# Patient Record
Sex: FEMALE | Race: WHITE | NOT HISPANIC OR LATINO | Employment: FULL TIME | URBAN - METROPOLITAN AREA
[De-identification: names, ages, dates, MRNs, and addresses within clinical notes are randomized per-mention and may not be internally consistent; named-entity substitution may affect disease eponyms.]

---

## 2019-06-19 ENCOUNTER — TELEPHONE (OUTPATIENT)
Dept: OBSTETRICS AND GYNECOLOGY | Facility: CLINIC | Age: 34
End: 2019-06-19

## 2019-06-19 DIAGNOSIS — Z32.01 POSITIVE PREGNANCY TEST: Primary | ICD-10-CM

## 2019-06-19 NOTE — TELEPHONE ENCOUNTER
----- Message from Lorene Mishra sent at 6/19/2019  2:29 PM CDT -----  Contact: pt  She's calling in regards to schedule an appt,  New preg pt     pls call pt back at [phone] (home)

## 2019-06-19 NOTE — TELEPHONE ENCOUNTER
Spoke to patient and this is her first pregnancy. Scheduled her appointment for 07/22/19 at 1:00pm to see MARY Rubio at the [place] location. Pt. Aware to arrive 30 min prior to appt and to be prepared to give a urine sample. Patient will be self pay. Let her know the cost for an initial ob appt is $850. Patient has questions regarding payments. Let her know I will send a message to Emilee, our  to give her a call and answer her questions. Message sent to Emilee. Let patient know one of our nurses will be calling her to explain what will happen at the first visit and answer any questions she may have. Call ended.

## 2019-06-20 NOTE — TELEPHONE ENCOUNTER
Spoke to patient. Welcomed to practice, questions answered. Ultrasound ordered and scheduled with visit r/s at [facility] with Joanne on 7/16/19. Patient verbalized understanding.

## 2019-07-16 ENCOUNTER — LAB VISIT (OUTPATIENT)
Dept: LAB | Facility: HOSPITAL | Age: 34
End: 2019-07-16
Attending: ADVANCED PRACTICE MIDWIFE

## 2019-07-16 ENCOUNTER — INITIAL PRENATAL (OUTPATIENT)
Dept: OBSTETRICS AND GYNECOLOGY | Facility: CLINIC | Age: 34
End: 2019-07-16

## 2019-07-16 ENCOUNTER — PROCEDURE VISIT (OUTPATIENT)
Dept: OBSTETRICS AND GYNECOLOGY | Facility: CLINIC | Age: 34
End: 2019-07-16

## 2019-07-16 VITALS
SYSTOLIC BLOOD PRESSURE: 128 MMHG | DIASTOLIC BLOOD PRESSURE: 72 MMHG | BODY MASS INDEX: 30.14 KG/M2 | WEIGHT: 192.44 LBS

## 2019-07-16 DIAGNOSIS — Z32.01 POSITIVE PREGNANCY TEST: ICD-10-CM

## 2019-07-16 DIAGNOSIS — O26.841 UTERINE SIZE DATE DISCREPANCY, FIRST TRIMESTER: ICD-10-CM

## 2019-07-16 DIAGNOSIS — O26.891 RH NEGATIVE STATE IN ANTEPARTUM PERIOD, FIRST TRIMESTER: ICD-10-CM

## 2019-07-16 DIAGNOSIS — Z67.91 RH NEGATIVE STATE IN ANTEPARTUM PERIOD, FIRST TRIMESTER: ICD-10-CM

## 2019-07-16 DIAGNOSIS — Z34.81 SUPERVISION OF NORMAL INTRAUTERINE PREGNANCY IN MULTIGRAVIDA IN FIRST TRIMESTER: Primary | ICD-10-CM

## 2019-07-16 DIAGNOSIS — Z34.81 SUPERVISION OF NORMAL INTRAUTERINE PREGNANCY IN MULTIGRAVIDA IN FIRST TRIMESTER: ICD-10-CM

## 2019-07-16 LAB
BASOPHILS # BLD AUTO: 0.04 K/UL (ref 0–0.2)
BASOPHILS NFR BLD: 0.3 % (ref 0–1.9)
DIFFERENTIAL METHOD: ABNORMAL
EOSINOPHIL # BLD AUTO: 0.2 K/UL (ref 0–0.5)
EOSINOPHIL NFR BLD: 1.6 % (ref 0–8)
ERYTHROCYTE [DISTWIDTH] IN BLOOD BY AUTOMATED COUNT: 13.2 % (ref 11.5–14.5)
HCT VFR BLD AUTO: 36.5 % (ref 37–48.5)
HGB BLD-MCNC: 12 G/DL (ref 12–16)
IMM GRANULOCYTES # BLD AUTO: 0.03 K/UL (ref 0–0.04)
IMM GRANULOCYTES NFR BLD AUTO: 0.3 % (ref 0–0.5)
LYMPHOCYTES # BLD AUTO: 2.7 K/UL (ref 1–4.8)
LYMPHOCYTES NFR BLD: 23.6 % (ref 18–48)
MCH RBC QN AUTO: 30.4 PG (ref 27–31)
MCHC RBC AUTO-ENTMCNC: 32.9 G/DL (ref 32–36)
MCV RBC AUTO: 92 FL (ref 82–98)
MONOCYTES # BLD AUTO: 0.7 K/UL (ref 0.3–1)
MONOCYTES NFR BLD: 6.3 % (ref 4–15)
NEUTROPHILS # BLD AUTO: 7.9 K/UL (ref 1.8–7.7)
NEUTROPHILS NFR BLD: 67.9 % (ref 38–73)
NRBC BLD-RTO: 0 /100 WBC
PLATELET # BLD AUTO: 242 K/UL (ref 150–350)
PMV BLD AUTO: 10.5 FL (ref 9.2–12.9)
RBC # BLD AUTO: 3.95 M/UL (ref 4–5.4)
WBC # BLD AUTO: 11.6 K/UL (ref 3.9–12.7)

## 2019-07-16 PROCEDURE — 86592 SYPHILIS TEST NON-TREP QUAL: CPT

## 2019-07-16 PROCEDURE — 76801 PR US, OB <14WKS, TRANSABD, SINGLE GESTATION: ICD-10-PCS | Mod: 26,S$PBB,, | Performed by: OBSTETRICS & GYNECOLOGY

## 2019-07-16 PROCEDURE — 99999 PR PBB SHADOW E&M-EST. PATIENT-LVL II: ICD-10-PCS | Mod: PBBFAC,,, | Performed by: ADVANCED PRACTICE MIDWIFE

## 2019-07-16 PROCEDURE — 99203 PR OFFICE/OUTPT VISIT, NEW, LEVL III, 30-44 MIN: ICD-10-PCS | Mod: S$PBB,,, | Performed by: ADVANCED PRACTICE MIDWIFE

## 2019-07-16 PROCEDURE — 86850 RBC ANTIBODY SCREEN: CPT

## 2019-07-16 PROCEDURE — 86703 HIV-1/HIV-2 1 RESULT ANTBDY: CPT

## 2019-07-16 PROCEDURE — 86901 BLOOD TYPING SEROLOGIC RH(D): CPT

## 2019-07-16 PROCEDURE — 76801 OB US < 14 WKS SINGLE FETUS: CPT | Mod: PBBFAC | Performed by: OBSTETRICS & GYNECOLOGY

## 2019-07-16 PROCEDURE — 87340 HEPATITIS B SURFACE AG IA: CPT

## 2019-07-16 PROCEDURE — 76801 OB US < 14 WKS SINGLE FETUS: CPT | Mod: 26,S$PBB,, | Performed by: OBSTETRICS & GYNECOLOGY

## 2019-07-16 PROCEDURE — 36415 COLL VENOUS BLD VENIPUNCTURE: CPT

## 2019-07-16 PROCEDURE — 99212 OFFICE O/P EST SF 10 MIN: CPT | Mod: PBBFAC | Performed by: ADVANCED PRACTICE MIDWIFE

## 2019-07-16 PROCEDURE — 88175 CYTOPATH C/V AUTO FLUID REDO: CPT

## 2019-07-16 PROCEDURE — 99999 PR PBB SHADOW E&M-EST. PATIENT-LVL II: CPT | Mod: PBBFAC,,, | Performed by: ADVANCED PRACTICE MIDWIFE

## 2019-07-16 PROCEDURE — 85025 COMPLETE CBC W/AUTO DIFF WBC: CPT

## 2019-07-16 PROCEDURE — 87491 CHLMYD TRACH DNA AMP PROBE: CPT

## 2019-07-16 PROCEDURE — 86762 RUBELLA ANTIBODY: CPT

## 2019-07-16 PROCEDURE — 99203 OFFICE O/P NEW LOW 30 MIN: CPT | Mod: S$PBB,,, | Performed by: ADVANCED PRACTICE MIDWIFE

## 2019-07-17 PROBLEM — Z67.91 RH NEGATIVE STATE IN ANTEPARTUM PERIOD, FIRST TRIMESTER: Status: ACTIVE | Noted: 2019-07-17

## 2019-07-17 PROBLEM — O26.891 RH NEGATIVE STATE IN ANTEPARTUM PERIOD, FIRST TRIMESTER: Status: ACTIVE | Noted: 2019-07-17

## 2019-07-17 LAB
ABO + RH BLD: NORMAL
BLD GP AB SCN CELLS X3 SERPL QL: NORMAL
C TRACH DNA SPEC QL NAA+PROBE: NOT DETECTED
HBV SURFACE AG SERPL QL IA: NEGATIVE
HIV 1+2 AB+HIV1 P24 AG SERPL QL IA: NEGATIVE
N GONORRHOEA DNA SPEC QL NAA+PROBE: NOT DETECTED
RH BLD: NORMAL
RPR SER QL: NORMAL
RUBV IGG SER-ACNC: 13.8 IU/ML
RUBV IGG SER-IMP: REACTIVE

## 2019-07-17 NOTE — PROGRESS NOTES
CHIEF COMPLAINT:   Patient presents with      initial OB        HISTORY OF PRESENT ILLNESS  Adelaida Agarwal 33 y.o.  presents for initial OB  The patient has no complaints today.  + nausea, no vomiting. No bleeding or pain.  Pregnancy was planned.  Partner is supportive of pregnancy, present today-Antony.  Lives at home with spouse.  +Cats at home, aware of toxoplasmosis risks.  Works as a /.  Denies domestic abuse.  Denies chemical/pesticide/radiation exposure.    LMP: Patient's last menstrual period was 2019.  EDC: Estimated Date of Delivery: 20  EGA: 8w6d       Health Maintenance   Topic Date Due    Lipid Panel  1986    TETANUS VACCINE  2004    Pap Smear with HPV Cotest  2015    Influenza Vaccine  2019       Past Medical History:   Diagnosis Date    Tobacco use        Past Surgical History:   Procedure Laterality Date    TONSILLECTOMY      WISDOM TOOTH EXTRACTION         Family History   Problem Relation Age of Onset    Hyperlipidemia Father     Diabetes Maternal Grandmother     Cancer Paternal Grandmother         colon    Alcohol abuse Paternal Grandfather        Social History     Socioeconomic History    Marital status: Single     Spouse name: Not on file    Number of children: Not on file    Years of education: Not on file    Highest education level: Not on file   Occupational History    Not on file   Social Needs    Financial resource strain: Not on file    Food insecurity:     Worry: Not on file     Inability: Not on file    Transportation needs:     Medical: Not on file     Non-medical: Not on file   Tobacco Use    Smoking status: Former Smoker     Packs/day: 1.00     Types: Cigarettes     Last attempt to quit: 2019     Years since quittin.2    Tobacco comment: on chantix   Substance and Sexual Activity    Alcohol use: Not Currently     Comment: socially    Drug use: Never    Sexual activity: Yes    Lifestyle    Physical activity:     Days per week: Not on file     Minutes per session: Not on file    Stress: Not on file   Relationships    Social connections:     Talks on phone: Not on file     Gets together: Not on file     Attends Cheondoism service: Not on file     Active member of club or organization: Not on file     Attends meetings of clubs or organizations: Not on file     Relationship status: Not on file   Other Topics Concern    Not on file   Social History Narrative    Not on file       No current outpatient medications on file.     No current facility-administered medications for this visit.        Review of patient's allergies indicates:   Allergen Reactions    Penicillins Rash and Other (See Comments)         PHYSICAL EXAM   Vitals:    07/16/19 1431   BP: 128/72        PAIN SCALE: 0/10 None    PHYSICAL EXAM    ROS:  GENERAL: No fever, chills, fatigability or weight loss.  CV: Denies chest pain  PULM: Denies shortness of breath or wheezing.  ABDOMEN: Appetite fine. No weight loss. Denies diarrhea, abdominal pain, hematemesis or blood in stool.  URINARY: No flank pain, dysuria or hematuria.  REPRODUCTIVE: No abnormal vaginal bleeding.       PE:   APPEARANCE: Well nourished, well developed, in no acute distress  CHEST: Clear to auscultation bilaterally  CV: Regular rate and rhythm  BREASTS: Symmetrical, no skin changes or visible lesions. No palpable masses, nipple discharge or adenopathy bilaterally.  ABDOMEN: Soft. No tenderness or masses. No hepatosplenomegaly. No hernias  PELVIC:   VULVA: No lesions. Normal female genitalia.  URETHRAL MEATUS: Normal size and location, no lesions, no prolapse.  URETHRA: No masses, tenderness, prolapse or scarring.  VAGINA: Moist and well rugated, no discharge, no significant cystocele or rectocele.  CERVIX: No lesions, normal diameter, no stenosis, no cervical motion tenderness.   UTERUS: 8 wk size, regular shape, mobile, non-tender, normal position, good  support.  ADNEXA: No masses, tenderness or CDS nodularity.  ANUS PERINEUM: No lesions, no relaxation, no external hemorrhoids.     UPT +    A/P:     -      Patient was counseled today on A.C.S. Pap guidelines and recommendations for yearly pelvic exams, mammograms and monthly self breast exams; to see her PCP for other health maintenance and pregnancy.   -      Patient's medications and medical history reviewed with patient and implications in pregnancy.   -      Pregnancy course discussed and 'AtoZ' book given. Patient was counseled on proper weight gain based on the [facility]'s recommendations based on her pre-pregnancy weight. Discussed foods to avoid in pregnancy (i.e. sushi, fish that are high in mercury, deli meat, and unpasteurized cheeses). Discussed prenatal vitamin options (i.e. stool softener, DHA). Discussed potential medical problems in pregnancy.  -     Discussed risk of Toxoplasmosis transmission from pets and reviewed risk reduction techniques.  -     Pt was counseled on exercise in pregnancy and weight gain recommendations.  -     Pt was counseled on travel recommendations and on risks of Zika virus exposure.  Current CDC Zika advisories and prevention techniques were reviewed with pt.  Pt denies any recent international travel and does not plan travel during pregnancy.  Pt reports that partner does not plan travel either.  -     Oriented to practice including CNM collaboration.   -     Follow-up routine OB  labs and u/s fabian palomoD. al

## 2021-04-19 ENCOUNTER — PATIENT MESSAGE (OUTPATIENT)
Dept: OBSTETRICS AND GYNECOLOGY | Facility: CLINIC | Age: 36
End: 2021-04-19

## 2021-04-19 DIAGNOSIS — Z34.90 PRENATAL CARE, ANTEPARTUM: Primary | ICD-10-CM

## 2021-04-20 ENCOUNTER — TELEPHONE (OUTPATIENT)
Dept: MATERNAL FETAL MEDICINE | Facility: CLINIC | Age: 36
End: 2021-04-20

## 2021-04-27 ENCOUNTER — INITIAL PRENATAL (OUTPATIENT)
Dept: OBSTETRICS AND GYNECOLOGY | Facility: CLINIC | Age: 36
End: 2021-04-27
Payer: COMMERCIAL

## 2021-04-27 ENCOUNTER — LAB VISIT (OUTPATIENT)
Dept: LAB | Facility: OTHER | Age: 36
End: 2021-04-27
Payer: COMMERCIAL

## 2021-04-27 VITALS
DIASTOLIC BLOOD PRESSURE: 74 MMHG | WEIGHT: 221.81 LBS | BODY MASS INDEX: 34.74 KG/M2 | SYSTOLIC BLOOD PRESSURE: 116 MMHG

## 2021-04-27 DIAGNOSIS — N91.2 AMENORRHEA: ICD-10-CM

## 2021-04-27 DIAGNOSIS — Z67.91 RH NEGATIVE STATE IN ANTEPARTUM PERIOD, FIRST TRIMESTER: ICD-10-CM

## 2021-04-27 DIAGNOSIS — Z34.90 PREGNANCY WITH ONE FETUS, ANTEPARTUM: ICD-10-CM

## 2021-04-27 DIAGNOSIS — Z34.80 ENCOUNTER FOR SUPERVISION OF NORMAL INTRAUTERINE PREGNANCY IN MULTIGRAVIDA, ANTEPARTUM: Primary | ICD-10-CM

## 2021-04-27 DIAGNOSIS — O26.891 RH NEGATIVE STATE IN ANTEPARTUM PERIOD, FIRST TRIMESTER: ICD-10-CM

## 2021-04-27 LAB
ABO + RH BLD: NORMAL
ANION GAP SERPL CALC-SCNC: 10 MMOL/L (ref 8–16)
BASOPHILS # BLD AUTO: 0.02 K/UL (ref 0–0.2)
BASOPHILS NFR BLD: 0.2 % (ref 0–1.9)
BLD GP AB SCN CELLS X3 SERPL QL: NORMAL
BUN SERPL-MCNC: 8 MG/DL (ref 6–20)
CALCIUM SERPL-MCNC: 8.7 MG/DL (ref 8.7–10.5)
CHLORIDE SERPL-SCNC: 105 MMOL/L (ref 95–110)
CO2 SERPL-SCNC: 22 MMOL/L (ref 23–29)
CREAT SERPL-MCNC: 0.6 MG/DL (ref 0.5–1.4)
DIFFERENTIAL METHOD: ABNORMAL
EOSINOPHIL # BLD AUTO: 0.1 K/UL (ref 0–0.5)
EOSINOPHIL NFR BLD: 0.8 % (ref 0–8)
ERYTHROCYTE [DISTWIDTH] IN BLOOD BY AUTOMATED COUNT: 13.1 % (ref 11.5–14.5)
EST. GFR  (AFRICAN AMERICAN): >60 ML/MIN/1.73 M^2
EST. GFR  (NON AFRICAN AMERICAN): >60 ML/MIN/1.73 M^2
GLUCOSE SERPL-MCNC: 84 MG/DL (ref 70–110)
HCT VFR BLD AUTO: 36.2 % (ref 37–48.5)
HGB BLD-MCNC: 12.1 G/DL (ref 12–16)
IMM GRANULOCYTES # BLD AUTO: 0.04 K/UL (ref 0–0.04)
IMM GRANULOCYTES NFR BLD AUTO: 0.4 % (ref 0–0.5)
LYMPHOCYTES # BLD AUTO: 2.4 K/UL (ref 1–4.8)
LYMPHOCYTES NFR BLD: 24.2 % (ref 18–48)
MCH RBC QN AUTO: 29.5 PG (ref 27–31)
MCHC RBC AUTO-ENTMCNC: 33.4 G/DL (ref 32–36)
MCV RBC AUTO: 88 FL (ref 82–98)
MONOCYTES # BLD AUTO: 0.6 K/UL (ref 0.3–1)
MONOCYTES NFR BLD: 6.2 % (ref 4–15)
NEUTROPHILS # BLD AUTO: 6.6 K/UL (ref 1.8–7.7)
NEUTROPHILS NFR BLD: 68.2 % (ref 38–73)
NRBC BLD-RTO: 0 /100 WBC
PLATELET # BLD AUTO: 257 K/UL (ref 150–450)
PMV BLD AUTO: 10.2 FL (ref 9.2–12.9)
POTASSIUM SERPL-SCNC: 4.1 MMOL/L (ref 3.5–5.1)
RBC # BLD AUTO: 4.1 M/UL (ref 4–5.4)
SODIUM SERPL-SCNC: 137 MMOL/L (ref 136–145)
WBC # BLD AUTO: 9.7 K/UL (ref 3.9–12.7)

## 2021-04-27 PROCEDURE — 99999 PR PBB SHADOW E&M-EST. PATIENT-LVL II: CPT | Mod: PBBFAC,,, | Performed by: ADVANCED PRACTICE MIDWIFE

## 2021-04-27 PROCEDURE — 99999 PR PBB SHADOW E&M-EST. PATIENT-LVL II: ICD-10-PCS | Mod: PBBFAC,,, | Performed by: ADVANCED PRACTICE MIDWIFE

## 2021-04-27 PROCEDURE — 85025 COMPLETE CBC W/AUTO DIFF WBC: CPT | Performed by: ADVANCED PRACTICE MIDWIFE

## 2021-04-27 PROCEDURE — 80048 BASIC METABOLIC PNL TOTAL CA: CPT | Performed by: ADVANCED PRACTICE MIDWIFE

## 2021-04-27 PROCEDURE — 87491 CHLMYD TRACH DNA AMP PROBE: CPT | Performed by: ADVANCED PRACTICE MIDWIFE

## 2021-04-27 PROCEDURE — 87340 HEPATITIS B SURFACE AG IA: CPT | Performed by: ADVANCED PRACTICE MIDWIFE

## 2021-04-27 PROCEDURE — 0500F PR INITIAL PRENATAL CARE VISIT: ICD-10-PCS | Mod: S$GLB,,, | Performed by: ADVANCED PRACTICE MIDWIFE

## 2021-04-27 PROCEDURE — 86900 BLOOD TYPING SEROLOGIC ABO: CPT | Performed by: ADVANCED PRACTICE MIDWIFE

## 2021-04-27 PROCEDURE — 86762 RUBELLA ANTIBODY: CPT | Performed by: ADVANCED PRACTICE MIDWIFE

## 2021-04-27 PROCEDURE — 86703 HIV-1/HIV-2 1 RESULT ANTBDY: CPT | Performed by: ADVANCED PRACTICE MIDWIFE

## 2021-04-27 PROCEDURE — 0500F INITIAL PRENATAL CARE VISIT: CPT | Mod: S$GLB,,, | Performed by: ADVANCED PRACTICE MIDWIFE

## 2021-04-27 PROCEDURE — 87591 N.GONORRHOEAE DNA AMP PROB: CPT | Performed by: ADVANCED PRACTICE MIDWIFE

## 2021-04-27 PROCEDURE — 87086 URINE CULTURE/COLONY COUNT: CPT | Performed by: ADVANCED PRACTICE MIDWIFE

## 2021-04-27 PROCEDURE — 86803 HEPATITIS C AB TEST: CPT | Performed by: ADVANCED PRACTICE MIDWIFE

## 2021-04-27 PROCEDURE — 86592 SYPHILIS TEST NON-TREP QUAL: CPT | Performed by: ADVANCED PRACTICE MIDWIFE

## 2021-04-28 LAB
C TRACH DNA SPEC QL NAA+PROBE: NOT DETECTED
HBV SURFACE AG SERPL QL IA: NEGATIVE
HCV AB SERPL QL IA: NEGATIVE
HIV 1+2 AB+HIV1 P24 AG SERPL QL IA: NEGATIVE
N GONORRHOEA DNA SPEC QL NAA+PROBE: NOT DETECTED
RUBV IGG SER-ACNC: 15.7 IU/ML
RUBV IGG SER-IMP: REACTIVE

## 2021-04-29 LAB
BACTERIA UR CULT: NORMAL
RPR SER QL: NORMAL

## 2021-05-04 ENCOUNTER — PROCEDURE VISIT (OUTPATIENT)
Dept: MATERNAL FETAL MEDICINE | Facility: CLINIC | Age: 36
End: 2021-05-04
Payer: COMMERCIAL

## 2021-05-04 ENCOUNTER — PATIENT MESSAGE (OUTPATIENT)
Dept: RESEARCH | Facility: HOSPITAL | Age: 36
End: 2021-05-04

## 2021-05-04 DIAGNOSIS — Z36.89 ENCOUNTER FOR FETAL ANATOMIC SURVEY: ICD-10-CM

## 2021-05-04 DIAGNOSIS — Z36.89 ENCOUNTER FOR ULTRASOUND TO CHECK FETAL GROWTH: Primary | ICD-10-CM

## 2021-05-04 DIAGNOSIS — Z34.90 PRENATAL CARE, ANTEPARTUM: ICD-10-CM

## 2021-05-04 PROCEDURE — 76805 PR US, OB 14+WKS, TRANSABD, SINGLE GESTATION: ICD-10-PCS | Mod: S$GLB,,, | Performed by: OBSTETRICS & GYNECOLOGY

## 2021-05-04 PROCEDURE — 76805 OB US >/= 14 WKS SNGL FETUS: CPT | Mod: S$GLB,,, | Performed by: OBSTETRICS & GYNECOLOGY

## 2021-05-14 ENCOUNTER — TELEPHONE (OUTPATIENT)
Dept: OBSTETRICS AND GYNECOLOGY | Facility: CLINIC | Age: 36
End: 2021-05-14

## 2021-05-24 ENCOUNTER — DOCUMENTATION ONLY (OUTPATIENT)
Dept: OBSTETRICS AND GYNECOLOGY | Facility: CLINIC | Age: 36
End: 2021-05-24

## 2021-05-31 ENCOUNTER — PROCEDURE VISIT (OUTPATIENT)
Dept: MATERNAL FETAL MEDICINE | Facility: CLINIC | Age: 36
End: 2021-05-31
Payer: COMMERCIAL

## 2021-05-31 ENCOUNTER — ROUTINE PRENATAL (OUTPATIENT)
Dept: OBSTETRICS AND GYNECOLOGY | Facility: CLINIC | Age: 36
End: 2021-05-31
Payer: COMMERCIAL

## 2021-05-31 VITALS — WEIGHT: 234 LBS | SYSTOLIC BLOOD PRESSURE: 118 MMHG | BODY MASS INDEX: 36.65 KG/M2 | DIASTOLIC BLOOD PRESSURE: 74 MMHG

## 2021-05-31 DIAGNOSIS — Z36.89 ENCOUNTER FOR ULTRASOUND TO CHECK FETAL GROWTH: ICD-10-CM

## 2021-05-31 DIAGNOSIS — O26.891 RH NEGATIVE STATE IN ANTEPARTUM PERIOD, FIRST TRIMESTER: ICD-10-CM

## 2021-05-31 DIAGNOSIS — Z67.91 RH NEGATIVE STATE IN ANTEPARTUM PERIOD, FIRST TRIMESTER: ICD-10-CM

## 2021-05-31 DIAGNOSIS — Z34.80 ENCOUNTER FOR SUPERVISION OF NORMAL INTRAUTERINE PREGNANCY IN MULTIGRAVIDA, ANTEPARTUM: ICD-10-CM

## 2021-05-31 PROCEDURE — 0502F PR SUBSEQUENT PRENATAL CARE: ICD-10-PCS | Mod: CPTII,S$GLB,, | Performed by: ADVANCED PRACTICE MIDWIFE

## 2021-05-31 PROCEDURE — 99999 PR PBB SHADOW E&M-EST. PATIENT-LVL II: ICD-10-PCS | Mod: PBBFAC,,, | Performed by: ADVANCED PRACTICE MIDWIFE

## 2021-05-31 PROCEDURE — 99999 PR PBB SHADOW E&M-EST. PATIENT-LVL II: CPT | Mod: PBBFAC,,, | Performed by: ADVANCED PRACTICE MIDWIFE

## 2021-05-31 PROCEDURE — 0502F SUBSEQUENT PRENATAL CARE: CPT | Mod: CPTII,S$GLB,, | Performed by: ADVANCED PRACTICE MIDWIFE

## 2021-05-31 PROCEDURE — 76816 PR  US,PREGNANT UTERUS,F/U,TRANSABD APP: ICD-10-PCS | Mod: S$GLB,,, | Performed by: OBSTETRICS & GYNECOLOGY

## 2021-05-31 PROCEDURE — 76816 OB US FOLLOW-UP PER FETUS: CPT | Mod: S$GLB,,, | Performed by: OBSTETRICS & GYNECOLOGY

## 2021-06-28 ENCOUNTER — ROUTINE PRENATAL (OUTPATIENT)
Dept: OBSTETRICS AND GYNECOLOGY | Facility: CLINIC | Age: 36
End: 2021-06-28
Payer: COMMERCIAL

## 2021-06-28 VITALS
BODY MASS INDEX: 38.33 KG/M2 | WEIGHT: 244.69 LBS | DIASTOLIC BLOOD PRESSURE: 70 MMHG | SYSTOLIC BLOOD PRESSURE: 120 MMHG

## 2021-06-28 DIAGNOSIS — Z34.80 ENCOUNTER FOR SUPERVISION OF NORMAL INTRAUTERINE PREGNANCY IN MULTIGRAVIDA, ANTEPARTUM: Primary | ICD-10-CM

## 2021-06-28 PROCEDURE — 0502F PR SUBSEQUENT PRENATAL CARE: ICD-10-PCS | Mod: CPTII,S$GLB,, | Performed by: MIDWIFE

## 2021-06-28 PROCEDURE — 99999 PR PBB SHADOW E&M-EST. PATIENT-LVL II: CPT | Mod: PBBFAC,,, | Performed by: MIDWIFE

## 2021-06-28 PROCEDURE — 99999 PR PBB SHADOW E&M-EST. PATIENT-LVL II: ICD-10-PCS | Mod: PBBFAC,,, | Performed by: MIDWIFE

## 2021-06-28 PROCEDURE — 0502F SUBSEQUENT PRENATAL CARE: CPT | Mod: CPTII,S$GLB,, | Performed by: MIDWIFE

## 2021-07-07 ENCOUNTER — PATIENT MESSAGE (OUTPATIENT)
Dept: OBSTETRICS AND GYNECOLOGY | Facility: CLINIC | Age: 36
End: 2021-07-07

## 2021-07-08 DIAGNOSIS — Z91.89: Primary | ICD-10-CM

## 2021-07-13 ENCOUNTER — ROUTINE PRENATAL (OUTPATIENT)
Dept: OBSTETRICS AND GYNECOLOGY | Facility: CLINIC | Age: 36
End: 2021-07-13
Payer: COMMERCIAL

## 2021-07-13 ENCOUNTER — PATIENT MESSAGE (OUTPATIENT)
Dept: OBSTETRICS AND GYNECOLOGY | Facility: CLINIC | Age: 36
End: 2021-07-13

## 2021-07-13 ENCOUNTER — LAB VISIT (OUTPATIENT)
Dept: LAB | Facility: OTHER | Age: 36
End: 2021-07-13
Payer: COMMERCIAL

## 2021-07-13 VITALS
SYSTOLIC BLOOD PRESSURE: 122 MMHG | WEIGHT: 249.88 LBS | DIASTOLIC BLOOD PRESSURE: 74 MMHG | BODY MASS INDEX: 39.14 KG/M2

## 2021-07-13 DIAGNOSIS — Z34.90 PREGNANCY WITH ONE FETUS, ANTEPARTUM: Primary | ICD-10-CM

## 2021-07-13 DIAGNOSIS — Z34.80 ENCOUNTER FOR SUPERVISION OF NORMAL INTRAUTERINE PREGNANCY IN MULTIGRAVIDA, ANTEPARTUM: ICD-10-CM

## 2021-07-13 LAB
BASOPHILS # BLD AUTO: 0.02 K/UL (ref 0–0.2)
BASOPHILS NFR BLD: 0.2 % (ref 0–1.9)
DIFFERENTIAL METHOD: ABNORMAL
EOSINOPHIL # BLD AUTO: 0.2 K/UL (ref 0–0.5)
EOSINOPHIL NFR BLD: 1.3 % (ref 0–8)
ERYTHROCYTE [DISTWIDTH] IN BLOOD BY AUTOMATED COUNT: 13 % (ref 11.5–14.5)
GLUCOSE SERPL-MCNC: 102 MG/DL (ref 70–140)
HCT VFR BLD AUTO: 33.5 % (ref 37–48.5)
HGB BLD-MCNC: 11.3 G/DL (ref 12–16)
IMM GRANULOCYTES # BLD AUTO: 0.04 K/UL (ref 0–0.04)
IMM GRANULOCYTES NFR BLD AUTO: 0.3 % (ref 0–0.5)
LYMPHOCYTES # BLD AUTO: 2.1 K/UL (ref 1–4.8)
LYMPHOCYTES NFR BLD: 16.8 % (ref 18–48)
MCH RBC QN AUTO: 30 PG (ref 27–31)
MCHC RBC AUTO-ENTMCNC: 33.7 G/DL (ref 32–36)
MCV RBC AUTO: 89 FL (ref 82–98)
MONOCYTES # BLD AUTO: 0.8 K/UL (ref 0.3–1)
MONOCYTES NFR BLD: 6.8 % (ref 4–15)
NEUTROPHILS # BLD AUTO: 9.1 K/UL (ref 1.8–7.7)
NEUTROPHILS NFR BLD: 74.6 % (ref 38–73)
NRBC BLD-RTO: 0 /100 WBC
PLATELET # BLD AUTO: 284 K/UL (ref 150–450)
PMV BLD AUTO: 10.4 FL (ref 9.2–12.9)
RBC # BLD AUTO: 3.77 M/UL (ref 4–5.4)
WBC # BLD AUTO: 12.22 K/UL (ref 3.9–12.7)

## 2021-07-13 PROCEDURE — 87389 HIV-1 AG W/HIV-1&-2 AB AG IA: CPT | Performed by: MIDWIFE

## 2021-07-13 PROCEDURE — 0502F SUBSEQUENT PRENATAL CARE: CPT | Mod: CPTII,S$GLB,, | Performed by: ADVANCED PRACTICE MIDWIFE

## 2021-07-13 PROCEDURE — 36415 COLL VENOUS BLD VENIPUNCTURE: CPT | Performed by: MIDWIFE

## 2021-07-13 PROCEDURE — 99999 PR PBB SHADOW E&M-EST. PATIENT-LVL II: CPT | Mod: PBBFAC,,, | Performed by: ADVANCED PRACTICE MIDWIFE

## 2021-07-13 PROCEDURE — 99999 PR PBB SHADOW E&M-EST. PATIENT-LVL II: ICD-10-PCS | Mod: PBBFAC,,, | Performed by: ADVANCED PRACTICE MIDWIFE

## 2021-07-13 PROCEDURE — 0502F PR SUBSEQUENT PRENATAL CARE: ICD-10-PCS | Mod: CPTII,S$GLB,, | Performed by: ADVANCED PRACTICE MIDWIFE

## 2021-07-13 PROCEDURE — 86592 SYPHILIS TEST NON-TREP QUAL: CPT | Performed by: MIDWIFE

## 2021-07-13 PROCEDURE — 82950 GLUCOSE TEST: CPT | Performed by: MIDWIFE

## 2021-07-13 PROCEDURE — 85025 COMPLETE CBC W/AUTO DIFF WBC: CPT | Performed by: MIDWIFE

## 2021-07-14 ENCOUNTER — PATIENT MESSAGE (OUTPATIENT)
Dept: OBSTETRICS AND GYNECOLOGY | Facility: CLINIC | Age: 36
End: 2021-07-14

## 2021-07-15 LAB
HIV 1+2 AB+HIV1 P24 AG SERPL QL IA: NEGATIVE
RPR SER QL: NORMAL

## 2021-08-03 ENCOUNTER — PROCEDURE VISIT (OUTPATIENT)
Dept: MATERNAL FETAL MEDICINE | Facility: CLINIC | Age: 36
End: 2021-08-03
Payer: COMMERCIAL

## 2021-08-03 ENCOUNTER — LAB VISIT (OUTPATIENT)
Dept: LAB | Facility: OTHER | Age: 36
End: 2021-08-03
Attending: ADVANCED PRACTICE MIDWIFE
Payer: COMMERCIAL

## 2021-08-03 ENCOUNTER — ROUTINE PRENATAL (OUTPATIENT)
Dept: OBSTETRICS AND GYNECOLOGY | Facility: CLINIC | Age: 36
End: 2021-08-03
Payer: COMMERCIAL

## 2021-08-03 ENCOUNTER — PATIENT MESSAGE (OUTPATIENT)
Dept: ADMINISTRATIVE | Facility: OTHER | Age: 36
End: 2021-08-03

## 2021-08-03 VITALS
BODY MASS INDEX: 39.59 KG/M2 | WEIGHT: 252.75 LBS | DIASTOLIC BLOOD PRESSURE: 76 MMHG | SYSTOLIC BLOOD PRESSURE: 122 MMHG

## 2021-08-03 DIAGNOSIS — Z34.80 ENCOUNTER FOR SUPERVISION OF NORMAL INTRAUTERINE PREGNANCY IN MULTIGRAVIDA, ANTEPARTUM: ICD-10-CM

## 2021-08-03 DIAGNOSIS — Z34.90 PREGNANCY WITH ONE FETUS, ANTEPARTUM: ICD-10-CM

## 2021-08-03 DIAGNOSIS — Z36.89 ENCOUNTER FOR ULTRASOUND TO CHECK FETAL GROWTH: ICD-10-CM

## 2021-08-03 LAB
ABO + RH BLD: NORMAL
BLD GP AB SCN CELLS X3 SERPL QL: NORMAL

## 2021-08-03 PROCEDURE — 76816 PR  US,PREGNANT UTERUS,F/U,TRANSABD APP: ICD-10-PCS | Mod: S$GLB,,, | Performed by: OBSTETRICS & GYNECOLOGY

## 2021-08-03 PROCEDURE — 86900 BLOOD TYPING SEROLOGIC ABO: CPT | Performed by: ADVANCED PRACTICE MIDWIFE

## 2021-08-03 PROCEDURE — 36415 COLL VENOUS BLD VENIPUNCTURE: CPT | Performed by: ADVANCED PRACTICE MIDWIFE

## 2021-08-03 PROCEDURE — 99999 PR PBB SHADOW E&M-EST. PATIENT-LVL II: CPT | Mod: PBBFAC,,, | Performed by: MIDWIFE

## 2021-08-03 PROCEDURE — 99999 PR PBB SHADOW E&M-EST. PATIENT-LVL II: ICD-10-PCS | Mod: PBBFAC,,, | Performed by: MIDWIFE

## 2021-08-03 PROCEDURE — 0502F PR SUBSEQUENT PRENATAL CARE: ICD-10-PCS | Mod: CPTII,S$GLB,, | Performed by: MIDWIFE

## 2021-08-03 PROCEDURE — 0502F SUBSEQUENT PRENATAL CARE: CPT | Mod: CPTII,S$GLB,, | Performed by: MIDWIFE

## 2021-08-03 PROCEDURE — 76816 OB US FOLLOW-UP PER FETUS: CPT | Mod: S$GLB,,, | Performed by: OBSTETRICS & GYNECOLOGY

## 2021-08-18 ENCOUNTER — ROUTINE PRENATAL (OUTPATIENT)
Dept: OBSTETRICS AND GYNECOLOGY | Facility: CLINIC | Age: 36
End: 2021-08-18
Payer: COMMERCIAL

## 2021-08-18 VITALS
SYSTOLIC BLOOD PRESSURE: 114 MMHG | WEIGHT: 256.31 LBS | DIASTOLIC BLOOD PRESSURE: 60 MMHG | BODY MASS INDEX: 40.14 KG/M2

## 2021-08-18 DIAGNOSIS — Z34.90 PREGNANCY WITH ONE FETUS, ANTEPARTUM: Primary | ICD-10-CM

## 2021-08-18 PROCEDURE — 99999 PR PBB SHADOW E&M-EST. PATIENT-LVL II: CPT | Mod: PBBFAC,,, | Performed by: ADVANCED PRACTICE MIDWIFE

## 2021-08-18 PROCEDURE — 0502F PR SUBSEQUENT PRENATAL CARE: ICD-10-PCS | Mod: CPTII,S$GLB,, | Performed by: ADVANCED PRACTICE MIDWIFE

## 2021-08-18 PROCEDURE — 99999 PR PBB SHADOW E&M-EST. PATIENT-LVL II: ICD-10-PCS | Mod: PBBFAC,,, | Performed by: ADVANCED PRACTICE MIDWIFE

## 2021-08-18 PROCEDURE — 0502F SUBSEQUENT PRENATAL CARE: CPT | Mod: CPTII,S$GLB,, | Performed by: ADVANCED PRACTICE MIDWIFE

## 2021-08-27 ENCOUNTER — PATIENT MESSAGE (OUTPATIENT)
Dept: OBSTETRICS AND GYNECOLOGY | Facility: CLINIC | Age: 36
End: 2021-08-27

## 2021-09-03 ENCOUNTER — TELEPHONE (OUTPATIENT)
Dept: OBSTETRICS AND GYNECOLOGY | Facility: CLINIC | Age: 36
End: 2021-09-03

## 2021-09-12 ENCOUNTER — HOSPITAL ENCOUNTER (EMERGENCY)
Facility: OTHER | Age: 36
Discharge: HOME | End: 2021-09-12
Attending: OBSTETRICS & GYNECOLOGY
Payer: COMMERCIAL

## 2021-09-12 VITALS
HEART RATE: 85 BPM | DIASTOLIC BLOOD PRESSURE: 73 MMHG | OXYGEN SATURATION: 96 % | TEMPERATURE: 99 F | SYSTOLIC BLOOD PRESSURE: 119 MMHG

## 2021-09-12 DIAGNOSIS — Z3A.37 37 WEEKS GESTATION OF PREGNANCY: Primary | ICD-10-CM

## 2021-09-12 DIAGNOSIS — R68.89 ALTERATION IN BLOOD PRESSURE: ICD-10-CM

## 2021-09-12 LAB
ALBUMIN SERPL BCP-MCNC: 2.7 G/DL (ref 3.5–5.2)
ALP SERPL-CCNC: 118 U/L (ref 55–135)
ALT SERPL W/O P-5'-P-CCNC: 5 U/L (ref 10–44)
ANION GAP SERPL CALC-SCNC: 13 MMOL/L (ref 8–16)
AST SERPL-CCNC: 14 U/L (ref 10–40)
BASOPHILS # BLD AUTO: 0.03 K/UL (ref 0–0.2)
BASOPHILS NFR BLD: 0.2 % (ref 0–1.9)
BILIRUB SERPL-MCNC: 0.3 MG/DL (ref 0.1–1)
BUN SERPL-MCNC: 5 MG/DL (ref 6–20)
CALCIUM SERPL-MCNC: 8.6 MG/DL (ref 8.7–10.5)
CHLORIDE SERPL-SCNC: 107 MMOL/L (ref 95–110)
CO2 SERPL-SCNC: 18 MMOL/L (ref 23–29)
CREAT SERPL-MCNC: 0.5 MG/DL (ref 0.5–1.4)
CREAT UR-MCNC: 21.5 MG/DL (ref 15–325)
DIFFERENTIAL METHOD: ABNORMAL
EOSINOPHIL # BLD AUTO: 0.2 K/UL (ref 0–0.5)
EOSINOPHIL NFR BLD: 1.1 % (ref 0–8)
ERYTHROCYTE [DISTWIDTH] IN BLOOD BY AUTOMATED COUNT: 14.1 % (ref 11.5–14.5)
EST. GFR  (AFRICAN AMERICAN): >60 ML/MIN/1.73 M^2
EST. GFR  (NON AFRICAN AMERICAN): >60 ML/MIN/1.73 M^2
GLUCOSE SERPL-MCNC: 109 MG/DL (ref 70–110)
HCT VFR BLD AUTO: 33.1 % (ref 37–48.5)
HGB BLD-MCNC: 11.4 G/DL (ref 12–16)
IMM GRANULOCYTES # BLD AUTO: 0.06 K/UL (ref 0–0.04)
IMM GRANULOCYTES NFR BLD AUTO: 0.4 % (ref 0–0.5)
LYMPHOCYTES # BLD AUTO: 2.6 K/UL (ref 1–4.8)
LYMPHOCYTES NFR BLD: 17.7 % (ref 18–48)
MCH RBC QN AUTO: 30 PG (ref 27–31)
MCHC RBC AUTO-ENTMCNC: 34.4 G/DL (ref 32–36)
MCV RBC AUTO: 87 FL (ref 82–98)
MONOCYTES # BLD AUTO: 0.9 K/UL (ref 0.3–1)
MONOCYTES NFR BLD: 6.1 % (ref 4–15)
NEUTROPHILS # BLD AUTO: 10.8 K/UL (ref 1.8–7.7)
NEUTROPHILS NFR BLD: 74.5 % (ref 38–73)
NRBC BLD-RTO: 0 /100 WBC
PLATELET # BLD AUTO: 259 K/UL (ref 150–450)
PMV BLD AUTO: 11 FL (ref 9.2–12.9)
POTASSIUM SERPL-SCNC: 3.7 MMOL/L (ref 3.5–5.1)
PROT SERPL-MCNC: 6.4 G/DL (ref 6–8.4)
PROT UR-MCNC: <7 MG/DL (ref 0–15)
PROT/CREAT UR: NORMAL MG/G{CREAT} (ref 0–0.2)
RBC # BLD AUTO: 3.8 M/UL (ref 4–5.4)
SODIUM SERPL-SCNC: 138 MMOL/L (ref 136–145)
WBC # BLD AUTO: 14.55 K/UL (ref 3.9–12.7)

## 2021-09-12 PROCEDURE — 82570 ASSAY OF URINE CREATININE: CPT | Performed by: ADVANCED PRACTICE MIDWIFE

## 2021-09-12 PROCEDURE — 99284 EMERGENCY DEPT VISIT MOD MDM: CPT | Mod: 25

## 2021-09-12 PROCEDURE — 59025 FETAL NON-STRESS TEST: CPT

## 2021-09-12 PROCEDURE — 85025 COMPLETE CBC W/AUTO DIFF WBC: CPT | Performed by: ADVANCED PRACTICE MIDWIFE

## 2021-09-12 PROCEDURE — 80053 COMPREHEN METABOLIC PANEL: CPT | Performed by: ADVANCED PRACTICE MIDWIFE

## 2021-09-14 ENCOUNTER — LAB VISIT (OUTPATIENT)
Dept: LAB | Facility: OTHER | Age: 36
End: 2021-09-14
Attending: ADVANCED PRACTICE MIDWIFE
Payer: COMMERCIAL

## 2021-09-14 ENCOUNTER — ROUTINE PRENATAL (OUTPATIENT)
Dept: OBSTETRICS AND GYNECOLOGY | Facility: CLINIC | Age: 36
End: 2021-09-14
Payer: COMMERCIAL

## 2021-09-14 VITALS
BODY MASS INDEX: 41.12 KG/M2 | WEIGHT: 262.56 LBS | DIASTOLIC BLOOD PRESSURE: 76 MMHG | SYSTOLIC BLOOD PRESSURE: 128 MMHG

## 2021-09-14 DIAGNOSIS — Z34.90 PREGNANCY, UNSPECIFIED GESTATIONAL AGE: Primary | ICD-10-CM

## 2021-09-14 DIAGNOSIS — Z34.90 PREGNANCY, UNSPECIFIED GESTATIONAL AGE: ICD-10-CM

## 2021-09-14 LAB
ABO + RH BLD: NORMAL
BLD GP AB SCN CELLS X3 SERPL QL: NORMAL

## 2021-09-14 PROCEDURE — 86900 BLOOD TYPING SEROLOGIC ABO: CPT | Performed by: ADVANCED PRACTICE MIDWIFE

## 2021-09-14 PROCEDURE — 99999 PR PBB SHADOW E&M-EST. PATIENT-LVL I: ICD-10-PCS | Mod: PBBFAC,,, | Performed by: ADVANCED PRACTICE MIDWIFE

## 2021-09-14 PROCEDURE — 99999 PR PBB SHADOW E&M-EST. PATIENT-LVL I: CPT | Mod: PBBFAC,,, | Performed by: ADVANCED PRACTICE MIDWIFE

## 2021-09-14 PROCEDURE — 87184 SC STD DISK METHOD PER PLATE: CPT | Performed by: ADVANCED PRACTICE MIDWIFE

## 2021-09-14 PROCEDURE — 87147 CULTURE TYPE IMMUNOLOGIC: CPT | Performed by: ADVANCED PRACTICE MIDWIFE

## 2021-09-14 PROCEDURE — 36415 COLL VENOUS BLD VENIPUNCTURE: CPT | Performed by: ADVANCED PRACTICE MIDWIFE

## 2021-09-14 PROCEDURE — 0502F SUBSEQUENT PRENATAL CARE: CPT | Mod: CPTII,S$GLB,, | Performed by: ADVANCED PRACTICE MIDWIFE

## 2021-09-14 PROCEDURE — 87081 CULTURE SCREEN ONLY: CPT | Performed by: ADVANCED PRACTICE MIDWIFE

## 2021-09-14 PROCEDURE — 0502F PR SUBSEQUENT PRENATAL CARE: ICD-10-PCS | Mod: CPTII,S$GLB,, | Performed by: ADVANCED PRACTICE MIDWIFE

## 2021-09-17 ENCOUNTER — PATIENT MESSAGE (OUTPATIENT)
Dept: OBSTETRICS AND GYNECOLOGY | Facility: CLINIC | Age: 36
End: 2021-09-17

## 2021-09-18 LAB — BACTERIA SPEC AEROBE CULT: ABNORMAL

## 2021-09-19 ENCOUNTER — PATIENT MESSAGE (OUTPATIENT)
Dept: OBSTETRICS AND GYNECOLOGY | Facility: CLINIC | Age: 36
End: 2021-09-19

## 2021-09-21 ENCOUNTER — ROUTINE PRENATAL (OUTPATIENT)
Dept: OBSTETRICS AND GYNECOLOGY | Facility: CLINIC | Age: 36
End: 2021-09-21
Payer: COMMERCIAL

## 2021-09-21 VITALS
WEIGHT: 264.44 LBS | SYSTOLIC BLOOD PRESSURE: 124 MMHG | BODY MASS INDEX: 41.42 KG/M2 | DIASTOLIC BLOOD PRESSURE: 68 MMHG

## 2021-09-21 DIAGNOSIS — Z34.80 ENCOUNTER FOR SUPERVISION OF NORMAL INTRAUTERINE PREGNANCY IN MULTIGRAVIDA, ANTEPARTUM: Primary | ICD-10-CM

## 2021-09-21 PROCEDURE — 99999 PR PBB SHADOW E&M-EST. PATIENT-LVL II: CPT | Mod: PBBFAC,,, | Performed by: MIDWIFE

## 2021-09-21 PROCEDURE — 99999 PR PBB SHADOW E&M-EST. PATIENT-LVL II: ICD-10-PCS | Mod: PBBFAC,,, | Performed by: MIDWIFE

## 2021-09-21 PROCEDURE — 0502F PR SUBSEQUENT PRENATAL CARE: ICD-10-PCS | Mod: CPTII,S$GLB,, | Performed by: MIDWIFE

## 2021-09-21 PROCEDURE — 0502F SUBSEQUENT PRENATAL CARE: CPT | Mod: CPTII,S$GLB,, | Performed by: MIDWIFE

## 2021-09-26 ENCOUNTER — TELEPHONE (OUTPATIENT)
Dept: OBSTETRICS AND GYNECOLOGY | Facility: CLINIC | Age: 36
End: 2021-09-26

## 2021-09-28 ENCOUNTER — ROUTINE PRENATAL (OUTPATIENT)
Dept: OBSTETRICS AND GYNECOLOGY | Facility: CLINIC | Age: 36
End: 2021-09-28
Payer: COMMERCIAL

## 2021-09-28 VITALS — SYSTOLIC BLOOD PRESSURE: 122 MMHG | WEIGHT: 267 LBS | DIASTOLIC BLOOD PRESSURE: 84 MMHG | BODY MASS INDEX: 41.81 KG/M2

## 2021-09-28 DIAGNOSIS — O48.0 POST-TERM PREGNANCY, 40-42 WEEKS OF GESTATION: ICD-10-CM

## 2021-09-28 DIAGNOSIS — Z34.80 ENCOUNTER FOR SUPERVISION OF NORMAL INTRAUTERINE PREGNANCY IN MULTIGRAVIDA, ANTEPARTUM: Primary | ICD-10-CM

## 2021-09-28 PROCEDURE — 0502F SUBSEQUENT PRENATAL CARE: CPT | Mod: CPTII,S$GLB,, | Performed by: MIDWIFE

## 2021-09-28 PROCEDURE — 0502F PR SUBSEQUENT PRENATAL CARE: ICD-10-PCS | Mod: CPTII,S$GLB,, | Performed by: MIDWIFE

## 2021-09-28 PROCEDURE — 99999 PR PBB SHADOW E&M-EST. PATIENT-LVL I: CPT | Mod: PBBFAC,,, | Performed by: MIDWIFE

## 2021-09-28 PROCEDURE — 99999 PR PBB SHADOW E&M-EST. PATIENT-LVL I: ICD-10-PCS | Mod: PBBFAC,,, | Performed by: MIDWIFE

## 2021-09-29 ENCOUNTER — TELEPHONE (OUTPATIENT)
Dept: OBSTETRICS AND GYNECOLOGY | Facility: HOSPITAL | Age: 36
End: 2021-09-29

## 2021-09-29 ENCOUNTER — ANESTHESIA EVENT (OUTPATIENT)
Dept: OBSTETRICS AND GYNECOLOGY | Facility: OTHER | Age: 36
End: 2021-09-29

## 2021-09-29 ENCOUNTER — ANESTHESIA EVENT (OUTPATIENT)
Dept: OBSTETRICS AND GYNECOLOGY | Facility: OTHER | Age: 36
End: 2021-09-29
Payer: COMMERCIAL

## 2021-09-29 ENCOUNTER — ANESTHESIA (OUTPATIENT)
Dept: OBSTETRICS AND GYNECOLOGY | Facility: OTHER | Age: 36
End: 2021-09-29
Payer: COMMERCIAL

## 2021-09-29 ENCOUNTER — TELEPHONE (OUTPATIENT)
Dept: OBSTETRICS AND GYNECOLOGY | Facility: CLINIC | Age: 36
End: 2021-09-29

## 2021-09-29 ENCOUNTER — HOSPITAL ENCOUNTER (INPATIENT)
Facility: OTHER | Age: 36
LOS: 2 days | Discharge: HOME | End: 2021-10-01
Attending: OBSTETRICS & GYNECOLOGY | Admitting: OBSTETRICS & GYNECOLOGY
Payer: COMMERCIAL

## 2021-09-29 ENCOUNTER — ANESTHESIA (OUTPATIENT)
Dept: OBSTETRICS AND GYNECOLOGY | Facility: OTHER | Age: 36
End: 2021-09-29

## 2021-09-29 DIAGNOSIS — Z37.9 NORMAL LABOR: Primary | ICD-10-CM

## 2021-09-29 PROBLEM — O99.820 GBS (GROUP B STREPTOCOCCUS CARRIER), +RV CULTURE, CURRENTLY PREGNANT: Status: ACTIVE | Noted: 2021-09-29

## 2021-09-29 PROBLEM — R03.0 ELEVATED BLOOD PRESSURE READING IN OFFICE WITHOUT DIAGNOSIS OF HYPERTENSION: Status: ACTIVE | Noted: 2021-09-29

## 2021-09-29 LAB
ABO + RH BLD: NORMAL
ALBUMIN SERPL BCP-MCNC: 2.9 G/DL (ref 3.5–5.2)
ALP SERPL-CCNC: 144 U/L (ref 55–135)
ALT SERPL W/O P-5'-P-CCNC: <5 U/L (ref 10–44)
ANION GAP SERPL CALC-SCNC: 10 MMOL/L (ref 8–16)
AST SERPL-CCNC: 13 U/L (ref 10–40)
BASOPHILS # BLD AUTO: 0.03 K/UL (ref 0–0.2)
BASOPHILS NFR BLD: 0.2 % (ref 0–1.9)
BILIRUB SERPL-MCNC: 0.4 MG/DL (ref 0.1–1)
BLD GP AB SCN CELLS X3 SERPL QL: NORMAL
BUN SERPL-MCNC: 9 MG/DL (ref 6–20)
CALCIUM SERPL-MCNC: 9.6 MG/DL (ref 8.7–10.5)
CHLORIDE SERPL-SCNC: 105 MMOL/L (ref 95–110)
CO2 SERPL-SCNC: 20 MMOL/L (ref 23–29)
CREAT SERPL-MCNC: 0.6 MG/DL (ref 0.5–1.4)
DIFFERENTIAL METHOD: ABNORMAL
EOSINOPHIL # BLD AUTO: 0 K/UL (ref 0–0.5)
EOSINOPHIL NFR BLD: 0.1 % (ref 0–8)
ERYTHROCYTE [DISTWIDTH] IN BLOOD BY AUTOMATED COUNT: 14.1 % (ref 11.5–14.5)
EST. GFR  (AFRICAN AMERICAN): >60 ML/MIN/1.73 M^2
EST. GFR  (NON AFRICAN AMERICAN): >60 ML/MIN/1.73 M^2
GLUCOSE SERPL-MCNC: 105 MG/DL (ref 70–110)
HCT VFR BLD AUTO: 33.8 % (ref 37–48.5)
HGB BLD-MCNC: 11.7 G/DL (ref 12–16)
IMM GRANULOCYTES # BLD AUTO: 0.09 K/UL (ref 0–0.04)
IMM GRANULOCYTES NFR BLD AUTO: 0.5 % (ref 0–0.5)
LYMPHOCYTES # BLD AUTO: 1.3 K/UL (ref 1–4.8)
LYMPHOCYTES NFR BLD: 7.6 % (ref 18–48)
MCH RBC QN AUTO: 29.5 PG (ref 27–31)
MCHC RBC AUTO-ENTMCNC: 34.6 G/DL (ref 32–36)
MCV RBC AUTO: 85 FL (ref 82–98)
MONOCYTES # BLD AUTO: 0.9 K/UL (ref 0.3–1)
MONOCYTES NFR BLD: 5.1 % (ref 4–15)
NEUTROPHILS # BLD AUTO: 14.8 K/UL (ref 1.8–7.7)
NEUTROPHILS NFR BLD: 86.5 % (ref 38–73)
NRBC BLD-RTO: 0 /100 WBC
PLATELET # BLD AUTO: 257 K/UL (ref 150–450)
PMV BLD AUTO: 10.6 FL (ref 9.2–12.9)
POTASSIUM SERPL-SCNC: 4.1 MMOL/L (ref 3.5–5.1)
PROT SERPL-MCNC: 6.6 G/DL (ref 6–8.4)
RBC # BLD AUTO: 3.96 M/UL (ref 4–5.4)
SARS-COV-2 RDRP RESP QL NAA+PROBE: NEGATIVE
SODIUM SERPL-SCNC: 135 MMOL/L (ref 136–145)
WBC # BLD AUTO: 17.05 K/UL (ref 3.9–12.7)

## 2021-09-29 PROCEDURE — 80053 COMPREHEN METABOLIC PANEL: CPT | Performed by: OBSTETRICS & GYNECOLOGY

## 2021-09-29 PROCEDURE — U0002 COVID-19 LAB TEST NON-CDC: HCPCS | Performed by: OBSTETRICS & GYNECOLOGY

## 2021-09-29 PROCEDURE — 11000001 HC ACUTE MED/SURG PRIVATE ROOM

## 2021-09-29 PROCEDURE — 99284 EMERGENCY DEPT VISIT MOD MDM: CPT | Mod: 25

## 2021-09-29 PROCEDURE — 72200004 HC VAGINAL DELIVERY LEVEL I

## 2021-09-29 PROCEDURE — 99285 EMERGENCY DEPT VISIT HI MDM: CPT | Mod: 25,27

## 2021-09-29 PROCEDURE — 86900 BLOOD TYPING SEROLOGIC ABO: CPT | Performed by: MIDWIFE

## 2021-09-29 PROCEDURE — 85025 COMPLETE CBC W/AUTO DIFF WBC: CPT | Performed by: OBSTETRICS & GYNECOLOGY

## 2021-09-29 PROCEDURE — 59025 FETAL NON-STRESS TEST: CPT

## 2021-09-29 PROCEDURE — 72100002 HC LABOR CARE, 1ST 8 HOURS

## 2021-09-29 RX ORDER — MISOPROSTOL 200 UG/1
800 TABLET ORAL
Status: DISCONTINUED | OUTPATIENT
Start: 2021-09-29 | End: 2021-09-30

## 2021-09-29 RX ORDER — METHYLERGONOVINE MALEATE 0.2 MG/ML
200 INJECTION INTRAVENOUS
Status: DISCONTINUED | OUTPATIENT
Start: 2021-09-29 | End: 2021-09-30

## 2021-09-29 RX ORDER — CARBOPROST TROMETHAMINE 250 UG/ML
250 INJECTION, SOLUTION INTRAMUSCULAR
Status: DISCONTINUED | OUTPATIENT
Start: 2021-09-29 | End: 2021-09-30

## 2021-09-29 RX ORDER — ONDANSETRON 8 MG/1
8 TABLET, ORALLY DISINTEGRATING ORAL EVERY 8 HOURS PRN
Status: DISCONTINUED | OUTPATIENT
Start: 2021-09-29 | End: 2021-09-30

## 2021-09-29 RX ORDER — OXYTOCIN 10 [USP'U]/ML
INJECTION, SOLUTION INTRAMUSCULAR; INTRAVENOUS
Status: DISCONTINUED
Start: 2021-09-29 | End: 2021-09-30 | Stop reason: WASHOUT

## 2021-09-29 RX ORDER — CALCIUM CARBONATE 200(500)MG
500 TABLET,CHEWABLE ORAL 3 TIMES DAILY PRN
Status: DISCONTINUED | OUTPATIENT
Start: 2021-09-29 | End: 2021-09-30

## 2021-09-29 RX ORDER — DIPHENOXYLATE HYDROCHLORIDE AND ATROPINE SULFATE 2.5; .025 MG/1; MG/1
1 TABLET ORAL 4 TIMES DAILY PRN
Status: DISCONTINUED | OUTPATIENT
Start: 2021-09-29 | End: 2021-09-30

## 2021-09-29 RX ORDER — TRANEXAMIC ACID 100 MG/ML
1000 INJECTION, SOLUTION INTRAVENOUS ONCE AS NEEDED
Status: DISCONTINUED | OUTPATIENT
Start: 2021-09-29 | End: 2021-09-30

## 2021-09-29 RX ORDER — OXYTOCIN/RINGER'S LACTATE 30/500 ML
334 PLASTIC BAG, INJECTION (ML) INTRAVENOUS ONCE
Status: DISCONTINUED | OUTPATIENT
Start: 2021-09-29 | End: 2021-09-30

## 2021-09-29 RX ORDER — SODIUM CHLORIDE, SODIUM LACTATE, POTASSIUM CHLORIDE, CALCIUM CHLORIDE 600; 310; 30; 20 MG/100ML; MG/100ML; MG/100ML; MG/100ML
INJECTION, SOLUTION INTRAVENOUS CONTINUOUS
Status: DISCONTINUED | OUTPATIENT
Start: 2021-09-29 | End: 2021-09-30

## 2021-09-29 RX ORDER — SIMETHICONE 80 MG
1 TABLET,CHEWABLE ORAL 4 TIMES DAILY PRN
Status: DISCONTINUED | OUTPATIENT
Start: 2021-09-29 | End: 2021-09-30

## 2021-09-29 RX ORDER — PROCHLORPERAZINE EDISYLATE 5 MG/ML
5 INJECTION INTRAMUSCULAR; INTRAVENOUS EVERY 6 HOURS PRN
Status: DISCONTINUED | OUTPATIENT
Start: 2021-09-29 | End: 2021-09-30

## 2021-09-30 PROBLEM — Z37.9 NORMAL LABOR: Status: ACTIVE | Noted: 2021-09-30

## 2021-09-30 PROBLEM — Z37.9 NORMAL LABOR: Status: RESOLVED | Noted: 2021-09-30 | Resolved: 2021-09-30

## 2021-09-30 PROBLEM — Z37.9 NORMAL LABOR: Status: RESOLVED | Noted: 2021-09-29 | Resolved: 2021-09-30

## 2021-09-30 PROCEDURE — 59400 PR FULL ROUT OBSTE CARE,VAGINAL DELIV: ICD-10-PCS | Mod: GB,,, | Performed by: MIDWIFE

## 2021-09-30 PROCEDURE — 11000001 HC ACUTE MED/SURG PRIVATE ROOM

## 2021-09-30 PROCEDURE — 59400 OBSTETRICAL CARE: CPT | Mod: GB,,, | Performed by: MIDWIFE

## 2021-09-30 RX ORDER — SODIUM CHLORIDE 0.9 % (FLUSH) 0.9 %
10 SYRINGE (ML) INJECTION
Status: DISCONTINUED | OUTPATIENT
Start: 2021-09-30 | End: 2021-10-01 | Stop reason: HOSPADM

## 2021-09-30 RX ORDER — ACETAMINOPHEN 325 MG/1
650 TABLET ORAL EVERY 6 HOURS PRN
Status: DISCONTINUED | OUTPATIENT
Start: 2021-09-30 | End: 2021-10-01 | Stop reason: HOSPADM

## 2021-09-30 RX ORDER — ONDANSETRON 8 MG/1
8 TABLET, ORALLY DISINTEGRATING ORAL EVERY 8 HOURS PRN
Status: DISCONTINUED | OUTPATIENT
Start: 2021-09-30 | End: 2021-10-01 | Stop reason: HOSPADM

## 2021-09-30 RX ORDER — HYDROCORTISONE 25 MG/G
CREAM TOPICAL 3 TIMES DAILY PRN
Status: DISCONTINUED | OUTPATIENT
Start: 2021-09-30 | End: 2021-10-01 | Stop reason: HOSPADM

## 2021-09-30 RX ORDER — PRENATAL WITH FERROUS FUM AND FOLIC ACID 3080; 920; 120; 400; 22; 1.84; 3; 20; 10; 1; 12; 200; 27; 25; 2 [IU]/1; [IU]/1; MG/1; [IU]/1; MG/1; MG/1; MG/1; MG/1; MG/1; MG/1; UG/1; MG/1; MG/1; MG/1; MG/1
1 TABLET ORAL DAILY
Status: DISCONTINUED | OUTPATIENT
Start: 2021-09-30 | End: 2021-10-01 | Stop reason: HOSPADM

## 2021-09-30 RX ORDER — DIPHENHYDRAMINE HCL 25 MG
25 CAPSULE ORAL EVERY 4 HOURS PRN
Status: DISCONTINUED | OUTPATIENT
Start: 2021-09-30 | End: 2021-10-01 | Stop reason: HOSPADM

## 2021-09-30 RX ORDER — PROCHLORPERAZINE EDISYLATE 5 MG/ML
5 INJECTION INTRAMUSCULAR; INTRAVENOUS EVERY 6 HOURS PRN
Status: DISCONTINUED | OUTPATIENT
Start: 2021-09-30 | End: 2021-10-01 | Stop reason: HOSPADM

## 2021-09-30 RX ORDER — IBUPROFEN 600 MG/1
600 TABLET ORAL EVERY 6 HOURS PRN
Status: DISCONTINUED | OUTPATIENT
Start: 2021-09-30 | End: 2021-10-01 | Stop reason: HOSPADM

## 2021-09-30 RX ORDER — SIMETHICONE 80 MG
1 TABLET,CHEWABLE ORAL EVERY 6 HOURS PRN
Status: DISCONTINUED | OUTPATIENT
Start: 2021-09-30 | End: 2021-10-01 | Stop reason: HOSPADM

## 2021-09-30 RX ORDER — DOCUSATE SODIUM 100 MG/1
200 CAPSULE, LIQUID FILLED ORAL 2 TIMES DAILY PRN
Status: DISCONTINUED | OUTPATIENT
Start: 2021-09-30 | End: 2021-10-01 | Stop reason: HOSPADM

## 2021-09-30 RX ORDER — DIPHENHYDRAMINE HYDROCHLORIDE 50 MG/ML
25 INJECTION INTRAMUSCULAR; INTRAVENOUS EVERY 4 HOURS PRN
Status: DISCONTINUED | OUTPATIENT
Start: 2021-09-30 | End: 2021-10-01 | Stop reason: HOSPADM

## 2021-10-01 VITALS
RESPIRATION RATE: 16 BRPM | TEMPERATURE: 98 F | DIASTOLIC BLOOD PRESSURE: 79 MMHG | HEART RATE: 76 BPM | OXYGEN SATURATION: 97 % | SYSTOLIC BLOOD PRESSURE: 126 MMHG

## 2021-10-01 RX ORDER — DOCUSATE SODIUM 100 MG/1
200 CAPSULE, LIQUID FILLED ORAL 2 TIMES DAILY PRN
Qty: 30 CAPSULE | Refills: 0 | Status: SHIPPED | OUTPATIENT
Start: 2021-10-01

## 2021-10-11 ENCOUNTER — OFFICE VISIT (OUTPATIENT)
Dept: OBSTETRICS AND GYNECOLOGY | Facility: CLINIC | Age: 36
End: 2021-10-11
Payer: COMMERCIAL

## 2021-10-11 VITALS
WEIGHT: 239.88 LBS | BODY MASS INDEX: 37.57 KG/M2 | SYSTOLIC BLOOD PRESSURE: 122 MMHG | DIASTOLIC BLOOD PRESSURE: 70 MMHG

## 2021-10-11 DIAGNOSIS — Z34.90 PREGNANCY WITH ONE FETUS, ANTEPARTUM: Primary | ICD-10-CM

## 2021-10-11 PROCEDURE — 99999 PR PBB SHADOW E&M-EST. PATIENT-LVL II: CPT | Mod: PBBFAC,,, | Performed by: ADVANCED PRACTICE MIDWIFE

## 2021-10-11 PROCEDURE — 0503F POSTPARTUM CARE VISIT: CPT | Mod: S$GLB,,, | Performed by: ADVANCED PRACTICE MIDWIFE

## 2021-10-11 PROCEDURE — 0503F PR POSTPARTUM CARE VISIT: ICD-10-PCS | Mod: S$GLB,,, | Performed by: ADVANCED PRACTICE MIDWIFE

## 2021-10-11 PROCEDURE — 3008F PR BODY MASS INDEX (BMI) DOCUMENTED: ICD-10-PCS | Mod: CPTII,S$GLB,, | Performed by: ADVANCED PRACTICE MIDWIFE

## 2021-10-11 PROCEDURE — 1159F MED LIST DOCD IN RCRD: CPT | Mod: CPTII,S$GLB,, | Performed by: ADVANCED PRACTICE MIDWIFE

## 2021-10-11 PROCEDURE — 1111F DSCHRG MED/CURRENT MED MERGE: CPT | Mod: CPTII,S$GLB,, | Performed by: ADVANCED PRACTICE MIDWIFE

## 2021-10-11 PROCEDURE — 1159F PR MEDICATION LIST DOCUMENTED IN MEDICAL RECORD: ICD-10-PCS | Mod: CPTII,S$GLB,, | Performed by: ADVANCED PRACTICE MIDWIFE

## 2021-10-11 PROCEDURE — 3074F PR MOST RECENT SYSTOLIC BLOOD PRESSURE < 130 MM HG: ICD-10-PCS | Mod: CPTII,S$GLB,, | Performed by: ADVANCED PRACTICE MIDWIFE

## 2021-10-11 PROCEDURE — 1111F PR DISCHARGE MEDS RECONCILED W/ CURRENT OUTPATIENT MED LIST: ICD-10-PCS | Mod: CPTII,S$GLB,, | Performed by: ADVANCED PRACTICE MIDWIFE

## 2021-10-11 PROCEDURE — 3008F BODY MASS INDEX DOCD: CPT | Mod: CPTII,S$GLB,, | Performed by: ADVANCED PRACTICE MIDWIFE

## 2021-10-11 PROCEDURE — 99999 PR PBB SHADOW E&M-EST. PATIENT-LVL II: ICD-10-PCS | Mod: PBBFAC,,, | Performed by: ADVANCED PRACTICE MIDWIFE

## 2021-10-11 PROCEDURE — 3078F DIAST BP <80 MM HG: CPT | Mod: CPTII,S$GLB,, | Performed by: ADVANCED PRACTICE MIDWIFE

## 2021-10-11 PROCEDURE — 3074F SYST BP LT 130 MM HG: CPT | Mod: CPTII,S$GLB,, | Performed by: ADVANCED PRACTICE MIDWIFE

## 2021-10-11 PROCEDURE — 3078F PR MOST RECENT DIASTOLIC BLOOD PRESSURE < 80 MM HG: ICD-10-PCS | Mod: CPTII,S$GLB,, | Performed by: ADVANCED PRACTICE MIDWIFE

## 2024-01-17 LAB
ABO + RH BLD: NORMAL
ANTIBODY SCREEN: NEGATIVE
C TRACH RRNA SPEC QL PROBE: NEGATIVE
HBV SURFACE AG SERPL QL IA: NEGATIVE
HIV 1+2 AB+HIV1 P24 AG SERPL QL IA: NON REACTIVE
N GONORRHOEAE, AMPLIFIED DNA: NEGATIVE
RPR: NON REACTIVE
RUBELLA IMMUNE STATUS: NORMAL

## 2024-05-09 LAB — GLUCOSE SERPL-MCNC: 123 MG/DL

## 2024-07-03 LAB — PRENATAL STREP B CULTURE: POSITIVE

## 2024-07-16 DIAGNOSIS — E66.9 OBESITY: Primary | ICD-10-CM

## 2024-07-18 ENCOUNTER — HOSPITAL ENCOUNTER (OUTPATIENT)
Facility: HOSPITAL | Age: 39
Discharge: HOME | End: 2024-07-18
Attending: OBSTETRICS & GYNECOLOGY | Admitting: OBSTETRICS & GYNECOLOGY
Payer: COMMERCIAL

## 2024-07-18 VITALS — SYSTOLIC BLOOD PRESSURE: 129 MMHG | HEART RATE: 78 BPM | DIASTOLIC BLOOD PRESSURE: 71 MMHG

## 2024-07-18 DIAGNOSIS — E66.9 OBESITY: ICD-10-CM

## 2024-07-18 PROCEDURE — 59025 FETAL NON-STRESS TEST: CPT

## 2024-07-24 ENCOUNTER — HOSPITAL ENCOUNTER (OUTPATIENT)
Facility: HOSPITAL | Age: 39
Discharge: HOME | End: 2024-07-24
Attending: OBSTETRICS & GYNECOLOGY | Admitting: OBSTETRICS & GYNECOLOGY
Payer: COMMERCIAL

## 2024-07-24 VITALS — RESPIRATION RATE: 18 BRPM | DIASTOLIC BLOOD PRESSURE: 71 MMHG | SYSTOLIC BLOOD PRESSURE: 129 MMHG | HEART RATE: 74 BPM

## 2024-07-24 DIAGNOSIS — O99.210 OBESITY AFFECTING PREGNANCY: ICD-10-CM

## 2024-07-24 PROCEDURE — 59025 FETAL NON-STRESS TEST: CPT

## 2024-07-31 ENCOUNTER — HOSPITAL ENCOUNTER (OUTPATIENT)
Facility: HOSPITAL | Age: 39
Discharge: HOME | End: 2024-07-31
Attending: OBSTETRICS & GYNECOLOGY | Admitting: OBSTETRICS & GYNECOLOGY
Payer: COMMERCIAL

## 2024-07-31 VITALS — RESPIRATION RATE: 17 BRPM | HEART RATE: 93 BPM | SYSTOLIC BLOOD PRESSURE: 122 MMHG | DIASTOLIC BLOOD PRESSURE: 67 MMHG

## 2024-07-31 DIAGNOSIS — E66.9 OBESITY: ICD-10-CM

## 2024-07-31 PROCEDURE — 59025 FETAL NON-STRESS TEST: CPT

## 2024-08-07 ENCOUNTER — HOSPITAL ENCOUNTER (OUTPATIENT)
Facility: HOSPITAL | Age: 39
Discharge: HOME | End: 2024-08-07
Attending: OBSTETRICS & GYNECOLOGY | Admitting: OBSTETRICS & GYNECOLOGY
Payer: COMMERCIAL

## 2024-08-07 ENCOUNTER — HOSPITAL ENCOUNTER (INPATIENT)
Facility: HOSPITAL | Age: 39
LOS: 3 days | Discharge: HOME | End: 2024-08-10
Attending: OBSTETRICS & GYNECOLOGY | Admitting: OBSTETRICS & GYNECOLOGY
Payer: COMMERCIAL

## 2024-08-07 VITALS — SYSTOLIC BLOOD PRESSURE: 131 MMHG | DIASTOLIC BLOOD PRESSURE: 73 MMHG | HEART RATE: 94 BPM

## 2024-08-07 DIAGNOSIS — O48.0 POST-DATES PREGNANCY: ICD-10-CM

## 2024-08-07 DIAGNOSIS — Z37.9 NORMAL LABOR: Primary | ICD-10-CM

## 2024-08-07 PROBLEM — O99.213 OBESITY AFFECTING PREGNANCY IN THIRD TRIMESTER, ANTEPARTUM: Status: ACTIVE | Noted: 2024-08-07

## 2024-08-07 PROBLEM — Z34.80 ENCOUNTER FOR SUPERVISION OF NORMAL INTRAUTERINE PREGNANCY IN MULTIGRAVIDA, ANTEPARTUM: Status: RESOLVED | Noted: 2019-07-16 | Resolved: 2024-08-07

## 2024-08-07 PROBLEM — O09.899 RUBELLA NON-IMMUNE STATUS, ANTEPARTUM: Status: ACTIVE | Noted: 2024-08-07

## 2024-08-07 PROBLEM — Z28.39 RUBELLA NON-IMMUNE STATUS, ANTEPARTUM: Status: ACTIVE | Noted: 2024-08-07

## 2024-08-07 PROBLEM — O26.893 RH NEGATIVE STATE IN ANTEPARTUM PERIOD, THIRD TRIMESTER: Status: ACTIVE | Noted: 2019-07-17

## 2024-08-07 LAB
ABO + RH BLD: NORMAL
AMPHET+METHAMPHET UR QL: NEGATIVE
BARBITURATES UR QL SCN>200 NG/ML: NEGATIVE
BASOPHILS # BLD AUTO: 0.02 K/UL (ref 0–0.2)
BASOPHILS NFR BLD: 0.1 % (ref 0–1.9)
BENZODIAZ UR QL SCN>200 NG/ML: NEGATIVE
BLD GP AB SCN CELLS X3 SERPL QL: NORMAL
BUPRENORPHINE UR QL: NEGATIVE
BZE UR QL SCN: NEGATIVE
CANNABINOIDS UR QL SCN: NEGATIVE
CREAT UR-MCNC: 58.5 MG/DL (ref 15–325)
DIFFERENTIAL METHOD BLD: ABNORMAL
EOSINOPHIL # BLD AUTO: 0.1 K/UL (ref 0–0.5)
EOSINOPHIL NFR BLD: 0.7 % (ref 0–8)
ERYTHROCYTE [DISTWIDTH] IN BLOOD BY AUTOMATED COUNT: 14.9 % (ref 11.5–14.5)
FENTANYL UR QL SCN: NORMAL
HCT VFR BLD AUTO: 33.7 % (ref 37–48.5)
HGB BLD-MCNC: 11.5 G/DL (ref 12–16)
IMM GRANULOCYTES # BLD AUTO: 0.11 K/UL (ref 0–0.04)
IMM GRANULOCYTES NFR BLD AUTO: 0.7 % (ref 0–0.5)
LYMPHOCYTES # BLD AUTO: 2.1 K/UL (ref 1–4.8)
LYMPHOCYTES NFR BLD: 13.3 % (ref 18–48)
MCH RBC QN AUTO: 29 PG (ref 27–31)
MCHC RBC AUTO-ENTMCNC: 34.1 G/DL (ref 32–36)
MCV RBC AUTO: 85 FL (ref 82–98)
MONOCYTES # BLD AUTO: 1.1 K/UL (ref 0.3–1)
MONOCYTES NFR BLD: 7.2 % (ref 4–15)
NEUTROPHILS # BLD AUTO: 12.3 K/UL (ref 1.8–7.7)
NEUTROPHILS NFR BLD: 78 % (ref 38–73)
NRBC BLD-RTO: 0 /100 WBC
OPIATES UR QL SCN: NEGATIVE
PCP UR QL SCN>25 NG/ML: NEGATIVE
PLATELET # BLD AUTO: 258 K/UL (ref 150–450)
PMV BLD AUTO: 10.5 FL (ref 9.2–12.9)
RBC # BLD AUTO: 3.96 M/UL (ref 4–5.4)
TOXICOLOGY INFORMATION: NORMAL
WBC # BLD AUTO: 15.78 K/UL (ref 3.9–12.7)

## 2024-08-07 PROCEDURE — 86901 BLOOD TYPING SEROLOGIC RH(D): CPT

## 2024-08-07 PROCEDURE — 86593 SYPHILIS TEST NON-TREP QUANT: CPT

## 2024-08-07 PROCEDURE — 80307 DRUG TEST PRSMV CHEM ANLYZR: CPT

## 2024-08-07 PROCEDURE — 86900 BLOOD TYPING SEROLOGIC ABO: CPT

## 2024-08-07 PROCEDURE — 86850 RBC ANTIBODY SCREEN: CPT

## 2024-08-07 PROCEDURE — 12000002 HC ACUTE/MED SURGE SEMI-PRIVATE ROOM

## 2024-08-07 PROCEDURE — 99211 OFF/OP EST MAY X REQ PHY/QHP: CPT | Mod: 25

## 2024-08-07 PROCEDURE — 85025 COMPLETE CBC W/AUTO DIFF WBC: CPT

## 2024-08-07 PROCEDURE — 80348 DRUG SCREENING BUPRENORPHINE: CPT

## 2024-08-07 PROCEDURE — 80354 DRUG SCREENING FENTANYL: CPT

## 2024-08-07 PROCEDURE — 59025 FETAL NON-STRESS TEST: CPT

## 2024-08-07 RX ORDER — OXYTOCIN-SODIUM CHLORIDE 0.9% IV SOLN 30 UNIT/500ML 30-0.9/5 UT/ML-%
10 SOLUTION INTRAVENOUS ONCE
Status: DISCONTINUED | OUTPATIENT
Start: 2024-08-07 | End: 2024-08-10

## 2024-08-07 RX ORDER — TRANEXAMIC ACID 10 MG/ML
1000 INJECTION, SOLUTION INTRAVENOUS EVERY 30 MIN PRN
Status: DISCONTINUED | OUTPATIENT
Start: 2024-08-07 | End: 2024-08-10

## 2024-08-07 RX ORDER — SODIUM CHLORIDE, SODIUM LACTATE, POTASSIUM CHLORIDE, CALCIUM CHLORIDE 600; 310; 30; 20 MG/100ML; MG/100ML; MG/100ML; MG/100ML
INJECTION, SOLUTION INTRAVENOUS CONTINUOUS
Status: DISCONTINUED | OUTPATIENT
Start: 2024-08-07 | End: 2024-08-10

## 2024-08-07 RX ORDER — SIMETHICONE 80 MG
1 TABLET,CHEWABLE ORAL 4 TIMES DAILY PRN
Status: DISCONTINUED | OUTPATIENT
Start: 2024-08-07 | End: 2024-08-10

## 2024-08-07 RX ORDER — METHYLERGONOVINE MALEATE 0.2 MG/ML
200 INJECTION INTRAVENOUS ONCE AS NEEDED
Status: DISCONTINUED | OUTPATIENT
Start: 2024-08-07 | End: 2024-08-10

## 2024-08-07 RX ORDER — MISOPROSTOL 200 UG/1
800 TABLET ORAL ONCE AS NEEDED
Status: DISCONTINUED | OUTPATIENT
Start: 2024-08-07 | End: 2024-08-10

## 2024-08-07 RX ORDER — LIDOCAINE HYDROCHLORIDE 10 MG/ML
10 INJECTION, SOLUTION INFILTRATION; PERINEURAL ONCE AS NEEDED
Status: DISCONTINUED | OUTPATIENT
Start: 2024-08-07 | End: 2024-08-10

## 2024-08-07 RX ORDER — OXYTOCIN-SODIUM CHLORIDE 0.9% IV SOLN 30 UNIT/500ML 30-0.9/5 UT/ML-%
10 SOLUTION INTRAVENOUS ONCE AS NEEDED
Status: DISCONTINUED | OUTPATIENT
Start: 2024-08-07 | End: 2024-08-10

## 2024-08-07 RX ORDER — CARBOPROST TROMETHAMINE 250 UG/ML
250 INJECTION, SOLUTION INTRAMUSCULAR
Status: DISCONTINUED | OUTPATIENT
Start: 2024-08-07 | End: 2024-08-10

## 2024-08-07 RX ORDER — SODIUM CHLORIDE 9 MG/ML
INJECTION, SOLUTION INTRAVENOUS
Status: DISCONTINUED | OUTPATIENT
Start: 2024-08-07 | End: 2024-08-10

## 2024-08-07 RX ORDER — ONDANSETRON 4 MG/1
8 TABLET, ORALLY DISINTEGRATING ORAL EVERY 8 HOURS PRN
Status: DISCONTINUED | OUTPATIENT
Start: 2024-08-07 | End: 2024-08-10

## 2024-08-07 RX ORDER — DIPHENOXYLATE HYDROCHLORIDE AND ATROPINE SULFATE 2.5; .025 MG/1; MG/1
2 TABLET ORAL EVERY 6 HOURS PRN
Status: DISCONTINUED | OUTPATIENT
Start: 2024-08-07 | End: 2024-08-10

## 2024-08-07 RX ORDER — CALCIUM CARBONATE 200(500)MG
500 TABLET,CHEWABLE ORAL 3 TIMES DAILY PRN
Status: DISCONTINUED | OUTPATIENT
Start: 2024-08-07 | End: 2024-08-10 | Stop reason: HOSPADM

## 2024-08-07 RX ORDER — OXYTOCIN 10 [USP'U]/ML
10 INJECTION, SOLUTION INTRAMUSCULAR; INTRAVENOUS ONCE AS NEEDED
Status: DISCONTINUED | OUTPATIENT
Start: 2024-08-07 | End: 2024-08-10

## 2024-08-07 RX ORDER — OXYTOCIN-SODIUM CHLORIDE 0.9% IV SOLN 30 UNIT/500ML 30-0.9/5 UT/ML-%
95 SOLUTION INTRAVENOUS ONCE
Status: COMPLETED | OUTPATIENT
Start: 2024-08-07 | End: 2024-08-08

## 2024-08-07 RX ORDER — OXYTOCIN-SODIUM CHLORIDE 0.9% IV SOLN 30 UNIT/500ML 30-0.9/5 UT/ML-%
95 SOLUTION INTRAVENOUS ONCE AS NEEDED
Status: DISCONTINUED | OUTPATIENT
Start: 2024-08-07 | End: 2024-08-10

## 2024-08-08 ENCOUNTER — ANESTHESIA EVENT (OUTPATIENT)
Dept: OBSTETRICS AND GYNECOLOGY | Facility: HOSPITAL | Age: 39
End: 2024-08-08
Payer: COMMERCIAL

## 2024-08-08 ENCOUNTER — ANESTHESIA (OUTPATIENT)
Dept: OBSTETRICS AND GYNECOLOGY | Facility: HOSPITAL | Age: 39
End: 2024-08-08
Payer: COMMERCIAL

## 2024-08-08 PROBLEM — Z22.330 GBS CARRIER: Status: ACTIVE | Noted: 2021-09-29

## 2024-08-08 PROBLEM — O26.899 RH NEGATIVE, DELIVERED, CURRENT HOSPITALIZATION: Status: ACTIVE | Noted: 2019-07-17

## 2024-08-08 PROBLEM — E66.9 OBESITY: Status: ACTIVE | Noted: 2024-08-07

## 2024-08-08 LAB — TREPONEMA PALLIDUM IGG+IGM AB [PRESENCE] IN SERUM OR PLASMA BY IMMUNOASSAY: NONREACTIVE

## 2024-08-08 PROCEDURE — 25000003 PHARM REV CODE 250: Performed by: OBSTETRICS & GYNECOLOGY

## 2024-08-08 PROCEDURE — 63600175 PHARM REV CODE 636 W HCPCS: Performed by: SPECIALIST

## 2024-08-08 PROCEDURE — 12000002 HC ACUTE/MED SURGE SEMI-PRIVATE ROOM

## 2024-08-08 PROCEDURE — 0KQM0ZZ REPAIR PERINEUM MUSCLE, OPEN APPROACH: ICD-10-PCS | Performed by: OBSTETRICS & GYNECOLOGY

## 2024-08-08 PROCEDURE — 25000003 PHARM REV CODE 250: Performed by: ANESTHESIOLOGY

## 2024-08-08 PROCEDURE — 62326 NJX INTERLAMINAR LMBR/SAC: CPT | Performed by: ANESTHESIOLOGY

## 2024-08-08 PROCEDURE — 63600175 PHARM REV CODE 636 W HCPCS: Performed by: OBSTETRICS & GYNECOLOGY

## 2024-08-08 PROCEDURE — 27200710 HC EPIDURAL INFUSION PUMP SET: Performed by: ANESTHESIOLOGY

## 2024-08-08 PROCEDURE — C1751 CATH, INF, PER/CENT/MIDLINE: HCPCS | Performed by: ANESTHESIOLOGY

## 2024-08-08 PROCEDURE — 51702 INSERT TEMP BLADDER CATH: CPT

## 2024-08-08 PROCEDURE — 63600175 PHARM REV CODE 636 W HCPCS

## 2024-08-08 PROCEDURE — 72200005 HC VAGINAL DELIVERY LEVEL II

## 2024-08-08 RX ORDER — ONDANSETRON 4 MG/1
8 TABLET, ORALLY DISINTEGRATING ORAL EVERY 8 HOURS PRN
Status: DISCONTINUED | OUTPATIENT
Start: 2024-08-08 | End: 2024-08-10 | Stop reason: HOSPADM

## 2024-08-08 RX ORDER — OXYTOCIN-SODIUM CHLORIDE 0.9% IV SOLN 30 UNIT/500ML 30-0.9/5 UT/ML-%
10 SOLUTION INTRAVENOUS ONCE AS NEEDED
Status: DISCONTINUED | OUTPATIENT
Start: 2024-08-08 | End: 2024-08-10

## 2024-08-08 RX ORDER — MISOPROSTOL 200 UG/1
800 TABLET ORAL ONCE AS NEEDED
Status: DISCONTINUED | OUTPATIENT
Start: 2024-08-08 | End: 2024-08-10 | Stop reason: HOSPADM

## 2024-08-08 RX ORDER — DIPHENHYDRAMINE HYDROCHLORIDE 50 MG/ML
12.5 INJECTION INTRAMUSCULAR; INTRAVENOUS EVERY 4 HOURS PRN
Status: DISCONTINUED | OUTPATIENT
Start: 2024-08-08 | End: 2024-08-09

## 2024-08-08 RX ORDER — DIPHENOXYLATE HYDROCHLORIDE AND ATROPINE SULFATE 2.5; .025 MG/1; MG/1
2 TABLET ORAL EVERY 6 HOURS PRN
Status: DISCONTINUED | OUTPATIENT
Start: 2024-08-08 | End: 2024-08-10 | Stop reason: HOSPADM

## 2024-08-08 RX ORDER — NALOXONE HCL 0.4 MG/ML
0.4 VIAL (ML) INJECTION SEE ADMIN INSTRUCTIONS
Status: DISCONTINUED | OUTPATIENT
Start: 2024-08-08 | End: 2024-08-10

## 2024-08-08 RX ORDER — EPHEDRINE SULFATE 50 MG/ML
10 INJECTION, SOLUTION INTRAVENOUS ONCE AS NEEDED
Status: DISCONTINUED | OUTPATIENT
Start: 2024-08-08 | End: 2024-08-10

## 2024-08-08 RX ORDER — OXYTOCIN-SODIUM CHLORIDE 0.9% IV SOLN 30 UNIT/500ML 30-0.9/5 UT/ML-%
95 SOLUTION INTRAVENOUS ONCE AS NEEDED
Status: DISCONTINUED | OUTPATIENT
Start: 2024-08-08 | End: 2024-08-10

## 2024-08-08 RX ORDER — TRANEXAMIC ACID 10 MG/ML
1000 INJECTION, SOLUTION INTRAVENOUS EVERY 30 MIN PRN
Status: DISCONTINUED | OUTPATIENT
Start: 2024-08-08 | End: 2024-08-10 | Stop reason: HOSPADM

## 2024-08-08 RX ORDER — OXYTOCIN 10 [USP'U]/ML
10 INJECTION, SOLUTION INTRAMUSCULAR; INTRAVENOUS ONCE AS NEEDED
Status: DISCONTINUED | OUTPATIENT
Start: 2024-08-08 | End: 2024-08-10 | Stop reason: HOSPADM

## 2024-08-08 RX ORDER — CARBOPROST TROMETHAMINE 250 UG/ML
250 INJECTION, SOLUTION INTRAMUSCULAR
Status: DISCONTINUED | OUTPATIENT
Start: 2024-08-08 | End: 2024-08-10 | Stop reason: HOSPADM

## 2024-08-08 RX ORDER — BUTORPHANOL TARTRATE 2 MG/ML
1 INJECTION INTRAMUSCULAR; INTRAVENOUS
Status: DISCONTINUED | OUTPATIENT
Start: 2024-08-08 | End: 2024-08-10

## 2024-08-08 RX ORDER — OXYTOCIN-SODIUM CHLORIDE 0.9% IV SOLN 30 UNIT/500ML 30-0.9/5 UT/ML-%
0-32 SOLUTION INTRAVENOUS CONTINUOUS
Status: DISCONTINUED | OUTPATIENT
Start: 2024-08-08 | End: 2024-08-10

## 2024-08-08 RX ORDER — SODIUM CHLORIDE 0.9 % (FLUSH) 0.9 %
10 SYRINGE (ML) INJECTION
Status: DISCONTINUED | OUTPATIENT
Start: 2024-08-08 | End: 2024-08-10 | Stop reason: HOSPADM

## 2024-08-08 RX ORDER — METHYLERGONOVINE MALEATE 0.2 MG/ML
200 INJECTION INTRAVENOUS ONCE AS NEEDED
Status: DISCONTINUED | OUTPATIENT
Start: 2024-08-08 | End: 2024-08-10 | Stop reason: HOSPADM

## 2024-08-08 RX ORDER — LIDOCAINE HYDROCHLORIDE AND EPINEPHRINE 15; 5 MG/ML; UG/ML
INJECTION, SOLUTION EPIDURAL
Status: DISCONTINUED | OUTPATIENT
Start: 2024-08-08 | End: 2024-08-08

## 2024-08-08 RX ORDER — ONDANSETRON HYDROCHLORIDE 2 MG/ML
4 INJECTION, SOLUTION INTRAVENOUS ONCE
Status: DISCONTINUED | OUTPATIENT
Start: 2024-08-08 | End: 2024-08-10

## 2024-08-08 RX ORDER — ROPIVACAINE HYDROCHLORIDE 2 MG/ML
20 INJECTION, SOLUTION EPIDURAL; INFILTRATION ONCE AS NEEDED
Status: COMPLETED | OUTPATIENT
Start: 2024-08-08 | End: 2024-08-08

## 2024-08-08 RX ORDER — FENTANYL/BUPIVACAINE/NS/PF 2MCG/ML-.1
14 PLASTIC BAG, INJECTION (ML) INJECTION CONTINUOUS
Status: DISCONTINUED | OUTPATIENT
Start: 2024-08-08 | End: 2024-08-10

## 2024-08-08 RX ORDER — FENTANYL/BUPIVACAINE/NS/PF 2MCG/ML-.1
PLASTIC BAG, INJECTION (ML) INJECTION CONTINUOUS
Status: DISCONTINUED | OUTPATIENT
Start: 2024-08-08 | End: 2024-08-10

## 2024-08-08 RX ADMIN — SODIUM CHLORIDE, POTASSIUM CHLORIDE, SODIUM LACTATE AND CALCIUM CHLORIDE 500 ML: 600; 310; 30; 20 INJECTION, SOLUTION INTRAVENOUS at 03:08

## 2024-08-08 RX ADMIN — SODIUM CHLORIDE, SODIUM LACTATE, POTASSIUM CHLORIDE, AND CALCIUM CHLORIDE 1000 ML: .6; .31; .03; .02 INJECTION, SOLUTION INTRAVENOUS at 03:08

## 2024-08-08 RX ADMIN — SODIUM CHLORIDE, POTASSIUM CHLORIDE, SODIUM LACTATE AND CALCIUM CHLORIDE: 600; 310; 30; 20 INJECTION, SOLUTION INTRAVENOUS at 09:08

## 2024-08-08 RX ADMIN — Medication 2 MILLI-UNITS/MIN: at 09:08

## 2024-08-08 RX ADMIN — ROPIVACAINE HYDROCHLORIDE 3 ML: 2 INJECTION EPIDURAL; INFILTRATION; PERINEURAL at 04:08

## 2024-08-08 RX ADMIN — LIDOCAINE HYDROCHLORIDE,EPINEPHRINE BITARTRATE 3 ML: 15; .005 INJECTION, SOLUTION EPIDURAL; INFILTRATION; INTRACAUDAL; PERINEURAL at 04:08

## 2024-08-08 RX ADMIN — VANCOMYCIN HYDROCHLORIDE 2000 MG: 5 INJECTION, POWDER, LYOPHILIZED, FOR SOLUTION INTRAVENOUS at 07:08

## 2024-08-08 NOTE — ASSESSMENT & PLAN NOTE
SVE 8/85/-2  Intact  GBS pos - declines abx.   Intermittent monitoring  Desires minimal interventions and unmedicated birth  Expect vaginal delivery

## 2024-08-08 NOTE — ASSESSMENT & PLAN NOTE
Declines abx in labor if no SROM prior to delivery  Risks and benefits of abx prophylaxis for GBS discussed at length.     Labor. Pt requests min interventions and has had multiple visits and discussions regarding risks and recommended interventions. Refuses GBS prophylaxis. Did not receive Rhogam. I am available and present on unit.

## 2024-08-08 NOTE — PROGRESS NOTES
[facility]  Obstetrics  Labor Progress Note    Patient Name: Adelaida Agarwal  MRN: [id number]  Admission Date: 2024  Hospital Length of Stay: 1 days  Attending Physician: Loyda Cunningham MD  Primary Care Provider: Aron Barrera Jr., MD    Subjective:     Principal Problem:Normal labor    Hospital Course:  SVE 6.5/70/-2 on arrival, intact. Desires NCB.    No new subjective & objective note has been filed under this hospital service since the last note was generated.    Assessment/Plan:     39 y.o. female  at 40w6d for:    * Normal labor  Spontaneous onset of labor. Pt requests min interventions and has had multiple visits and discussions regarding risks and recommended interventions. Declines GBS prophylaxis. Did not receive Rhogam. I am available and present on unit.   SVE 90/-2 Moderate to strong contractions  Discussed AROM vs. Pitocin vs. Wait and see.  GBS pos - declines abx.   Intermittent monitoring  Desires minimal interventions and unmedicated birth  Will recheck in 2 hours  Expect vaginal delivery    Rubella non-immune status, antepartum  Discussed MMR   Declines MMR Post partum    GBS (group B Streptococcus carrier), +RV culture, currently pregnant  Declines abx in labor if no SROM prior to delivery  Risks and benefits of abx prophylaxis for GBS discussed at length.         Rh negative state in antepartum period, third trimester  Risk vs benefits of RhoGam discussed at length.  Declines RhoGam          Itzel Marcelo CNM  Obstetrics  [facility]

## 2024-08-08 NOTE — NURSING
Nurses Note -- 4 Eyes      8/8/2024   12:23 AM      Skin assessed during: Admit      [x] No Altered Skin Integrity Present    []Prevention Measures Documented      [] Yes- Altered Skin Integrity Present or Discovered   [] LDA Added if Not in Epic (Describe Wound)   [] New Altered Skin Integrity was Present on Admit and Documented in LDA   [] Wound Image Taken    Wound Care Consulted? No    Attending Nurse:  Loida Mendez RNC    Second RN/Staff Member:  SEBASTIÁN Prince RN

## 2024-08-08 NOTE — NURSING
OBGYN LABS ENTERED INTO RESULTS CONSOLE      1st Trimester Labs Entered Into Results Console     [x] AOBRH   [x] Rubella Immune   [x] RPR   [x] HBsAg   [x] HIV   [x] Chlamydia   [x] Gonorrhea   [] Cell-Free DNA   [] Hep-C   [] Varicella    2nd Trimester Labs Entered Into Results Console     [x]OB Glucose Screen      3rd Trimester Labs Entered Into Results Console      [x] GBS   [] RPR    Drug Screen Entered Into Results Console     [] Benzodiazes   [] Methadone   [] Cocaine (Metab)   [] Opiate   [] Amphetamine   [] Marijuana   [] Creatinine   [] Amphetamines-Beaker   [] Barbituates-Beaker   [] Benzodiazepine-Beaker   [] Cannabinoids-Beaker   [] Cocaine-Beaker   [] Fentanyl-Beaker   [] MDMA-Beaker   [] Opiates-Beaker   [] Phencyclidine-Beaker        Results Entered by Rahel Lopez RN    Results Verified for Manual Entry Accuracy by ADITI Mendez RN

## 2024-08-08 NOTE — ASSESSMENT & PLAN NOTE
"Spontaneous onset of labor. Pt requests min interventions and has had multiple visits and discussions regarding risks and recommended interventions. Declines GBS prophylaxis. Did not receive Rhogam. I am available and present on unit.   SVE 9.5/100/-2 Moderate to strong contractions - remains unchanged  Forebag ruptured spontaneously  Pitocin infusing at 6 mu/min  GBS pos - declines abx.   Cont. Monitoring  Expect vaginal delivery    Full conversation and assessment with the patient.  Patient is absolutely refusing intervention.  She has agreed to wear a monitor for some time to adequately measure the contraction frequency as well as fetal heart tones for a little longer.  That is being placed right now.  Fetal heart tones intermittently overnight have been acceptable.  Patient has refused Pitocin despite my discussion that is safe and effective in warranted at this point.  Patient is refusing group B strep prophylaxis as she has only been ruptured for 2 hours.  She will consider it " later" if she remains ruptured and undelivered.  She mentioned something like 12 hours.  I have discussed the risk of group B strep sepsis and ramifications and patient says she understands those and does not take antibiotics herself and will not give them to her children at this point.   Patient and no way shape perform wants a  delivery except for an extreme measures for extreme fetal distress.  She is worried the Pitocin will lead to that.  I have discussed this is a very unusual and protracted labor course.  Patient has minimal pain and not adequate contractions and not making progress.  I again recommended Pitocin and she has declined.  I am documenting as not in complete agreement with the plan of care; patient understands this.  She has agreed to try the peanut ball.  "

## 2024-08-08 NOTE — PROGRESS NOTES
[facility]  Obstetrics  Labor Progress Note    Patient Name: Adelaida Agarwal  MRN: [id number]  Admission Date: 2024  Hospital Length of Stay: 1 days  Attending Physician: Loyda Cunningham MD  Primary Care Provider: Aron Barrera Jr., MD    Subjective:     Principal Problem:Normal labor    Hospital Course:  SVE 6.5/70/-2 on arrival, intact. Desires NCB.    Interval History:  Adelaida is a 39 y.o.  at 40w6d. She is doing well. Pitocin infusing at 6 mu. Tolerating well with cont labor support.     Objective:     Vital Signs (Most Recent):  Temp: 98.2 °F (36.8 °C) (24 1305)  Pulse: 96 (24 1305)  Resp: 20 (24 1305)  BP: (!) 134/92 (24 1305)  SpO2: 98 % (24 1123) Vital Signs (24h Range):  Temp:  [97.3 °F (36.3 °C)-98.6 °F (37 °C)] 98.2 °F (36.8 °C)  Pulse:  [] 96  Resp:  [18-20] 20  SpO2:  [92 %-99 %] 98 %  BP: (120-141)/(68-92) 134/92        There is no height or weight on file to calculate BMI.    FHT: 135 Cat 1 (reassuring)  TOCO:  Q 3-4 minutes    Cervical Exam:  Dilation:  9  Effacement:  100  Station: -1  Presentation: Vertex     Significant Labs:  Lab Results   Component Value Date    GROUPTRH A NEG 2024    HEPBSAG Negative 2024    STREPBCULT positive 2024       I have personallly reviewed all pertinent lab results from the last 24 hours.  Recent Lab Results         24        Benzodiazepines   Negative       Phencyclidine   Negative       BUPRENORPHINE   Negative  Comment: Buprenorphine urine screening threshold: 5 ng/mL.    This report is intended for use in clinical monitoring and management   of   patients only.          Amphetamines, Urine   Negative       Barbituates, Urine   Negative       Baso # 0.02         Basophil % 0.1         Cocaine, Urine   Negative       Urine Creatinine   58.5  Comment: The random urine reference ranges provided were established   for 24 hour urine collections.   No reference ranges exist for  random urine specimens.  Correlate clinically.            58.5  Comment: The random urine reference ranges provided were established   for 24 hour urine collections.  No reference ranges exist for  random urine specimens.  Correlate clinically.            58.5  Comment: The random urine reference ranges provided were established   for 24 hour urine collections.  No reference ranges exist for  random urine specimens.  Correlate clinically.         Differential Method Automated         Eos # 0.1         Eos % 0.7         Fentanyl, Urine   Negative @ANAYELI  Comment: The cut-off value is 5.0 ng/mL. This is a screening test. If results   do not   correlate with clinical presentation then a confirmatory send out   test is   advised. This result is intended for use in clinical management. It   is not   intended for use in employment related testing.         Gran # (ANC) 12.3         Gran % 78.0         Group & Rh A NEG         Hematocrit 33.7         Hemoglobin 11.5         Immature Grans (Abs) 0.11  Comment: Mild elevation in immature granulocytes is non specific and   can be seen in a variety of conditions including stress response,   acute inflammation, trauma and pregnancy. Correlation with other   laboratory and clinical findings is essential.           Immature Granulocytes 0.7         INDIRECT ALONDRA NEG         Lymph # 2.1         Lymph % 13.3         MCH 29.0         MCHC 34.1         MCV 85         Mono # 1.1         Mono % 7.2         MPV 10.5         nRBC 0         Opiates, Urine   Negative       Platelet Count 258         RBC 3.96         RDW 14.9         Marijuana (THC) Metabolite   Negative       Toxicology Information   SEE COMMENT  Comment: This screen includes the following classes of drugs at the   listed cut-off:    Benzodiazepines                  200 ng/ml  Cocaine metabolite               300 ng/ml  Opiates                          300 ng/ml  Barbiturates                      200 ng/ml  Amphetamines                    1000 ng/ml  Marijuana metabs (THC)            50 ng/ml  Phencyclidine (PCP)               25 ng/ml    High concentrations of Methylenedioxymethamphetamine (MDMA aka  Ectasy) and other structurally similar compounds may cross-   react with the Amphetamine/Methamphetamine screening   immunoassay giving a false positive result.    Note: This exception list includes only more common   interferants in toxicology screen testing.  Because of many   cross-reactantspositive results on toxicology drug screens   should be confirmed whenever results do not correlate with   clinical presentation.    This report is intended for use in clinical monitoring and  management of patients. It is not intended for use in   employment related drug testing.    Because of any cross-reactants, positive results on toxicology  drug screens should be confirmed whenever results do not  correlate with clinical presentation.    Presumptive positive results are unconfirmed and may be used   only for medical purposes.         WBC 15.78                 Physical Exam:   Constitutional: She is oriented to person, place, and time.    HENT:   Head: Normocephalic and atraumatic.    Eyes: Pupils are equal, round, and reactive to light. EOM are normal.     Cardiovascular:  Normal rate and regular rhythm.             Pulmonary/Chest: Effort normal and breath sounds normal.          Genitourinary:    Uterus normal.   There is vaginal discharge (Spontaneous rupture of forebag. Clear fluid) in the vagina.           Musculoskeletal: Normal range of motion and moves all extremeties.       Neurological: She is alert and oriented to person, place, and time.    Skin: Skin is warm and dry.    Psychiatric: She has a normal mood and affect. Her behavior is normal. Judgment and thought content normal.       Review of Systems  Assessment/Plan:     39 y.o. female  at 40w6d for:    * Normal labor  Spontaneous onset of  "labor. Pt requests min interventions and has had multiple visits and discussions regarding risks and recommended interventions. Declines GBS prophylaxis. Did not receive Rhogam. I am available and present on unit.   SVE 9.5/100/-2 Moderate to strong contractions - remains unchanged  Forebag ruptured spontaneously  Pitocin infusing at 6 mu/min  GBS pos - declines abx.   Cont. Monitoring  Expect vaginal delivery    Full conversation and assessment with the patient.  Patient is absolutely refusing intervention.  She has agreed to wear a monitor for some time to adequately measure the contraction frequency as well as fetal heart tones for a little longer.  That is being placed right now.  Fetal heart tones intermittently overnight have been acceptable.  Patient has refused Pitocin despite my discussion that is safe and effective in warranted at this point.  Patient is refusing group B strep prophylaxis as she has only been ruptured for 2 hours.  She will consider it " later" if she remains ruptured and undelivered.  She mentioned something like 12 hours.  I have discussed the risk of group B strep sepsis and ramifications and patient says she understands those and does not take antibiotics herself and will not give them to her children at this point.   Patient and no way shape perform wants a  delivery except for an extreme measures for extreme fetal distress.  She is worried the Pitocin will lead to that.  I have discussed this is a very unusual and protracted labor course.  Patient has minimal pain and not adequate contractions and not making progress.  I again recommended Pitocin and she has declined.  I am documenting as not in complete agreement with the plan of care; patient understands this.  She has agreed to try the peanut ball.    Rubella non-immune status, antepartum  Discussed MMR   Declines MMR Post partum    GBS (group B Streptococcus carrier), +RV culture, currently pregnant  Declines abx in " labor if no SROM prior to delivery  Risks and benefits of abx prophylaxis for GBS discussed at length.         Rh negative state in antepartum period, third trimester  Risk vs benefits of RhoGam discussed at length.  Declines RhoGam          Itzel Marcelo CNM  Obstetrics  [facility]

## 2024-08-08 NOTE — ASSESSMENT & PLAN NOTE
Declines abx in labor if no SROM prior to delivery  Risks and benefits of abx prophylaxis for GBS discussed at length.

## 2024-08-08 NOTE — PROGRESS NOTES
[facility]  Obstetrics  Labor Progress Note    Patient Name: Adelaida Agarwal  MRN: [id number]  Admission Date: 2024  Hospital Length of Stay: 1 days  Attending Physician: Loyda Cunningham MD  Primary Care Provider: Aron Barrera Jr., MD    Subjective:     Principal Problem:Normal labor    Hospital Course:  SVE 6.5/70/-2 on arrival, intact. Desires NCB.    Interval History:  Adelaida is a 39 y.o.  at 40w6d. She is doing well.  Appears very comfortable feeling an occasional contraction does report them to be 5-6 minutes apart.  However during our conversation she only had 1 small contraction.    Objective:     Vital Signs (Most Recent):  Temp: 97.3 °F (36.3 °C) (24 0659)  Pulse: 89 (24 0753)  Resp: 18 (24 0659)  BP: 131/79 (24 0753)  SpO2: (!) 92 % (24 0753) Vital Signs (24h Range):  Temp:  [97.3 °F (36.3 °C)-98.6 °F (37 °C)] 97.3 °F (36.3 °C)  Pulse:  [] 89  Resp:  [18-20] 18  SpO2:  [92 %-99 %] 92 %  BP: (120-141)/(68-80) 131/79        There is no height or weight on file to calculate BMI.    FHT:  Patient is on intermittent monitoring for labor.  She has agreed to put on a monitor to measure contractions and baby's heart tones more effectively  TOCO:  Per patient's 6 minutes apart    Cervical Exam:  Dilation:  8  Effacement:  100%  Station: -3  Presentation: Vertex     Significant Labs:  Lab Results   Component Value Date    GROUPTRH A NEG 2024    HEPBSAG Negative 2024    STREPBCULT positive 2024       CBC:   Recent Labs   Lab 245   WBC 15.78*   RBC 3.96*   HGB 11.5*   HCT 33.7*      MCV 85   MCH 29.0   MCHC 34.1     I have personallly reviewed all pertinent lab results from the last 24 hours.    Physical Exam    Review of Systems  Assessment/Plan:     39 y.o. female  at 40w6d for:    * Normal labor  Spontaneous onset of labor. Pt requests min interventions and has had multiple visits and  "discussions regarding risks and recommended interventions. Declines GBS prophylaxis. Did not receive Rhogam. I am available and present on unit.   SVE /-2 Moderate to strong contractions - remains unchanged  Discussed maternal exhaustion and pitocin.  Will start Pit per protocol  GBS pos - declines abx.   Cont. Monitoring  Will recheck in 2 hours  Expect vaginal delivery    Full conversation and assessment with the patient.  Patient is absolutely refusing intervention.  She has agreed to wear a monitor for some time to adequately measure the contraction frequency as well as fetal heart tones for a little longer.  That is being placed right now.  Fetal heart tones intermittently overnight have been acceptable.  Patient has refused Pitocin despite my discussion that is safe and effective in warranted at this point.  Patient is refusing group B strep prophylaxis as she has only been ruptured for 2 hours.  She will consider it " later" if she remains ruptured and undelivered.  She mentioned something like 12 hours.  I have discussed the risk of group B strep sepsis and ramifications and patient says she understands those and does not take antibiotics herself and will not give them to her children at this point.   Patient and no way shape perform wants a  delivery except for an extreme measures for extreme fetal distress.  She is worried the Pitocin will lead to that.  I have discussed this is a very unusual and protracted labor course.  Patient has minimal pain and not adequate contractions and not making progress.  I again recommended Pitocin and she has declined.  I am documenting as not in complete agreement with the plan of care; patient understands this.  She has agreed to try the peanut ball.    Rubella non-immune status, antepartum  Discussed MMR   Declines MMR Post partum    GBS (group B Streptococcus carrier), +RV culture, currently pregnant  Declines abx in labor if no SROM prior to " delivery  Risks and benefits of abx prophylaxis for GBS discussed at length.         Rh negative state in antepartum period, third trimester  Risk vs benefits of RhoGam discussed at length.  Declines RhoGam          Elaina Camacho MD  Obstetrics  [facility]

## 2024-08-08 NOTE — ASSESSMENT & PLAN NOTE
Spontaneous onset of labor. Pt requests min interventions and has had multiple visits and discussions regarding risks and recommended interventions. Declines GBS prophylaxis. Did not receive Rhogam. I am available and present on unit.   SVE 9/90/-2 Moderate to strong contractions - remains unchanged  Discussed maternal exhaustion and pitocin.  Will start Pit per protocol  GBS pos - declines abx.   Cont. Monitoring  Will recheck in 2 hours  Expect vaginal delivery

## 2024-08-08 NOTE — SUBJECTIVE & OBJECTIVE
Interval History:  Adelaida is a 39 y.o.  at 40w6d. She is doing well. Pitocin infusing at 6 mu. Tolerating well with cont labor support.     Objective:     Vital Signs (Most Recent):  Temp: 98.2 °F (36.8 °C) (24 1305)  Pulse: 96 (24 1305)  Resp: 20 (24 1305)  BP: (!) 134/92 (24 1305)  SpO2: 98 % (24 1123) Vital Signs (24h Range):  Temp:  [97.3 °F (36.3 °C)-98.6 °F (37 °C)] 98.2 °F (36.8 °C)  Pulse:  [] 96  Resp:  [18-20] 20  SpO2:  [92 %-99 %] 98 %  BP: (120-141)/(68-92) 134/92        There is no height or weight on file to calculate BMI.    FHT: 135 Cat 1 (reassuring)  TOCO:  Q 3-4 minutes    Cervical Exam:  Dilation:  9  Effacement:  100  Station: -1  Presentation: Vertex     Significant Labs:  Lab Results   Component Value Date    GROUPTRH A NEG 2024    HEPBSAG Negative 2024    STREPBCULT positive 2024       I have personallly reviewed all pertinent lab results from the last 24 hours.  Recent Lab Results         245   24  2158        Benzodiazepines   Negative       Phencyclidine   Negative       BUPRENORPHINE   Negative  Comment: Buprenorphine urine screening threshold: 5 ng/mL.    This report is intended for use in clinical monitoring and management   of   patients only.          Amphetamines, Urine   Negative       Barbituates, Urine   Negative       Baso # 0.02         Basophil % 0.1         Cocaine, Urine   Negative       Urine Creatinine   58.5  Comment: The random urine reference ranges provided were established   for 24 hour urine collections.  No reference ranges exist for  random urine specimens.  Correlate clinically.            58.5  Comment: The random urine reference ranges provided were established   for 24 hour urine collections.  No reference ranges exist for  random urine specimens.  Correlate clinically.            58.5  Comment: The random urine reference ranges provided were established   for 24 hour urine  collections.  No reference ranges exist for  random urine specimens.  Correlate clinically.         Differential Method Automated         Eos # 0.1         Eos % 0.7         Fentanyl, Urine   Negative @ANAYELI  Comment: The cut-off value is 5.0 ng/mL. This is a screening test. If results   do not   correlate with clinical presentation then a confirmatory send out   test is   advised. This result is intended for use in clinical management. It   is not   intended for use in employment related testing.         Gran # (ANC) 12.3         Gran % 78.0         Group & Rh A NEG         Hematocrit 33.7         Hemoglobin 11.5         Immature Grans (Abs) 0.11  Comment: Mild elevation in immature granulocytes is non specific and   can be seen in a variety of conditions including stress response,   acute inflammation, trauma and pregnancy. Correlation with other   laboratory and clinical findings is essential.           Immature Granulocytes 0.7         INDIRECT ALONDRA NEG         Lymph # 2.1         Lymph % 13.3         MCH 29.0         MCHC 34.1         MCV 85         Mono # 1.1         Mono % 7.2         MPV 10.5         nRBC 0         Opiates, Urine   Negative       Platelet Count 258         RBC 3.96         RDW 14.9         Marijuana (THC) Metabolite   Negative       Toxicology Information   SEE COMMENT  Comment: This screen includes the following classes of drugs at the   listed cut-off:    Benzodiazepines                  200 ng/ml  Cocaine metabolite               300 ng/ml  Opiates                          300 ng/ml  Barbiturates                     200 ng/ml  Amphetamines                    1000 ng/ml  Marijuana metabs (THC)            50 ng/ml  Phencyclidine (PCP)               25 ng/ml    High concentrations of Methylenedioxymethamphetamine (MDMA aka  Ectasy) and other structurally similar compounds may cross-   react with the Amphetamine/Methamphetamine screening   immunoassay giving a false positive result.    Note:  This exception list includes only more common   interferants in toxicology screen testing.  Because of many   cross-reactantspositive results on toxicology drug screens   should be confirmed whenever results do not correlate with   clinical presentation.    This report is intended for use in clinical monitoring and  management of patients. It is not intended for use in   employment related drug testing.    Because of any cross-reactants, positive results on toxicology  drug screens should be confirmed whenever results do not  correlate with clinical presentation.    Presumptive positive results are unconfirmed and may be used   only for medical purposes.         WBC 15.78                 Physical Exam:   Constitutional: She is oriented to person, place, and time.    HENT:   Head: Normocephalic and atraumatic.    Eyes: Pupils are equal, round, and reactive to light. EOM are normal.     Cardiovascular:  Normal rate and regular rhythm.             Pulmonary/Chest: Effort normal and breath sounds normal.          Genitourinary:    Uterus normal.   There is vaginal discharge (Spontaneous rupture of forebag. Clear fluid) in the vagina.           Musculoskeletal: Normal range of motion and moves all extremeties.       Neurological: She is alert and oriented to person, place, and time.    Skin: Skin is warm and dry.    Psychiatric: She has a normal mood and affect. Her behavior is normal. Judgment and thought content normal.       Review of Systems

## 2024-08-08 NOTE — ASSESSMENT & PLAN NOTE
SVE 6/70/-2  Intact  GBS pos - declines abx.   Desires minimal interventions and unmedicated birth  Expect vaginal delivery

## 2024-08-08 NOTE — NURSING
2150 To labor and delivery with complaints of contractions.  Pt has been in contact with her midwife.  CNM on floor to assess patient.  Patient is 6cm, admit orders rcv'd.      8055 Pt transferred to labor room 2309.

## 2024-08-08 NOTE — PROGRESS NOTES
[facility]  Obstetrics  Labor Progress Note    Patient Name: Adelaida Agarwal  MRN: [id number]  Admission Date: 2024  Hospital Length of Stay: 0 days  Attending Physician: Loyda Cunningham MD  Primary Care Provider: Aron Barrera Jr., MD    Subjective:     Principal Problem:<principal problem not specified>    Hospital Course:  No notes on file    Obstetric HPI:  39  presents in labor for CNM. I am covering and available physicia. This pregnancy has been complicated by obesity, AMA    OB History    Para Term  AB Living   4 2 2 0 1 2   SAB IAB Ectopic Multiple Live Births   0 1 0 0 2      # Outcome Date GA Lbr Darinel/2nd Weight Sex Type Anes PTL Lv   4 Current            3 Term 21 40w1d  3.62 kg (7 lb 15.7 oz) F Vag-Spont None N DIANDRA      Name: Marybel      Apgar1: 9  Apgar5: 9   2 Term 20 41w0d  3.402 kg (7 lb 8 oz) M  None N DIANDRA      Birth Comments: no comlpications      Name: Antony Rain   1 IAB              Past Medical History:   Diagnosis Date    Tobacco use     Past, quit      Past Surgical History:   Procedure Laterality Date    skin cyst      removal, not cancer    TONSILLECTOMY      as a child    WISDOM TOOTH EXTRACTION      No complications       PTA Medications   Medication Sig    docusate sodium (COLACE) 100 MG capsule Take 2 capsules (200 mg total) by mouth 2 (two) times daily as needed for Constipation.       Review of patient's allergies indicates:   Allergen Reactions    Penicillins Rash and Other (See Comments)    Rhogam ultra-filtered [rho(d) immune globulin] Rash        Family History       Problem Relation (Age of Onset)    Alcohol abuse Paternal Grandfather (40)    Cancer Paternal Grandmother (69)    Cirrhosis Paternal Grandfather    Diabetes Maternal Grandmother    Hyperlipidemia Father    No Known Problems Mother, Maternal Grandfather, Sister, Brother, Sister, Brother          Tobacco Use    Smoking status: Former     Current  packs/day: 0.00     Types: Cigarettes     Quit date: 2019     Years since quittin.2    Smokeless tobacco: Never    Tobacco comments:     on chantix, not any longer   Substance and Sexual Activity    Alcohol use: Not Currently     Comment: socially, not when pregnant    Drug use: Never    Sexual activity: Yes     Partners: Male     Birth control/protection: None     Comment: - Antony     Review of Systems   Constitutional: Negative.    Genitourinary:  Negative for vaginal bleeding.      Objective:     Vital Signs (Most Recent):    Vital Signs (24h Range):  Pulse:  [94] 94  BP: (131)/(73) 131/73        There is no height or weight on file to calculate BMI.    FHT: 130 Cat 1 (reassuring)  TOCO:  Q 5-6 minutes     Physical Exam:   Constitutional: She is oriented to person, place, and time. She appears well-developed and well-nourished.    HENT:   Head: Normocephalic.      Cardiovascular:  Normal rate, regular rhythm, normal heart sounds and intact distal pulses.           Pulse Score: 2+   Pulmonary/Chest: Effort normal and breath sounds normal.        Abdominal: Soft. Bowel sounds are normal.     Genitourinary:    Vagina and uterus normal.   No vaginal discharge (Denies LOF) in the vagina.           Musculoskeletal: Normal range of motion and moves all extremeties.       Neurological: She is alert and oriented to person, place, and time.    Skin: Skin is warm and dry.    Psychiatric: She has a normal mood and affect. Her behavior is normal. Judgment and thought content normal.        Cervix:  Dilation:  6  Effacement:  75%  Station: -2  Presentation: Vertex     Significant Labs:  Lab Results   Component Value Date    GROUPTRH A NEG 2021    HEPBSAG Negative 2021    STREPBCULT (A) 2021     STREPTOCOCCUS AGALACTIAE (GROUP B)  In case of Penicillin allergy, call lab for further testing.  Beta-hemolytic streptococci are routinely susceptible to   penicillins,cephalosporins and carbapenems.          I have personallly reviewed all pertinent lab results from the last 24 hours.  Recent Lab Results       None          Assessment/Plan:     39 y.o. female  at 40w5d for:    GBS (group B Streptococcus carrier), +RV culture, currently pregnant  Declines abx in labor if no SROM prior to delivery  Risks and benefits of abx prophylaxis for GBS discussed at length.     Labor. Pt requests min interventions and has had multiple visits and discussions regarding risks and recommended interventions. Refuses GBS prophylaxis. Did not receive Rhogam. I am available and present on unit.           Elaina Camacho MD  Obstetrics  [facility]

## 2024-08-08 NOTE — H&P
[facility]  Obstetrics  History & Physical    Patient Name: Adelaida Agarwal  MRN: [id number]  Admission Date: 2024  Primary Care Provider: Aron Barrera Jr., MD    Subjective:     Principal Problem:Normal labor    History of Present Illness:  39 year old  at 40.5 presents in spontaneous labor. Contractions began last night. Has been laboring at home until contractions felt more like active labor. GBS + - declines abx prophylaxis.     Obstetric HPI:  Patient reports contractions that have gotten stronger and closer together over the last 24 hours, active fetal movement, No vaginal bleeding , No loss of fluid     This pregnancy has been complicated by AMA, obesity    OB History    Para Term  AB Living   4 2 2 0 1 2   SAB IAB Ectopic Multiple Live Births   0 1 0 0 2      # Outcome Date GA Lbr Darinel/2nd Weight Sex Type Anes PTL Lv   4 Current            3 Term 21 40w1d  3.62 kg (7 lb 15.7 oz) F Vag-Spont None N DIANDRA      Name: Marybel      Apgar1: 9  Apgar5: 9   2 Term 20 41w0d  3.402 kg (7 lb 8 oz) M  None N DIANDRA      Birth Comments: no comlpications      Name: Antony Rain   1 IAB              Past Medical History:   Diagnosis Date    Tobacco use     Past, quit      Past Surgical History:   Procedure Laterality Date    skin cyst      removal, not cancer    TONSILLECTOMY      as a child    WISDOM TOOTH EXTRACTION      No complications       PTA Medications   Medication Sig    docusate sodium (COLACE) 100 MG capsule Take 2 capsules (200 mg total) by mouth 2 (two) times daily as needed for Constipation.       Review of patient's allergies indicates:   Allergen Reactions    Penicillins Rash and Other (See Comments)    Rhogam ultra-filtered [rho(d) immune globulin] Rash        Family History       Problem Relation (Age of Onset)    Alcohol abuse Paternal Grandfather (40)    Cancer Paternal Grandmother (69)    Cirrhosis Paternal Grandfather    Diabetes  Maternal Grandmother    Hyperlipidemia Father    No Known Problems Mother, Maternal Grandfather, Sister, Brother, Sister, Brother          Tobacco Use    Smoking status: Former     Current packs/day: 0.00     Types: Cigarettes     Quit date: 2019     Years since quittin.2    Smokeless tobacco: Never    Tobacco comments:     on chantix, not any longer   Substance and Sexual Activity    Alcohol use: Not Currently     Comment: socially, not when pregnant    Drug use: Never    Sexual activity: Yes     Partners: Male     Birth control/protection: None     Comment: - Antony     Review of Systems   Objective:     Vital Signs (Most Recent):    Vital Signs (24h Range):  Pulse:  [94] 94  BP: (131)/(73) 131/73        There is no height or weight on file to calculate BMI.    FHT: 135 Cat 1 (reassuring)  TOCO:  Q 5-6 minutes     Physical Exam:   Constitutional: She is oriented to person, place, and time. She appears well-developed and well-nourished.    HENT:   Head: Normocephalic and atraumatic.    Eyes: Pupils are equal, round, and reactive to light. EOM are normal.     Cardiovascular:  Normal rate and regular rhythm.             Pulmonary/Chest: Effort normal and breath sounds normal.        Abdominal: Soft. Bowel sounds are normal.     Genitourinary:    Vagina and uterus normal.   No vaginal discharge in the vagina.           Musculoskeletal: Normal range of motion and moves all extremeties.       Neurological: She is alert and oriented to person, place, and time. She has normal reflexes.    Skin: Skin is warm and dry.    Psychiatric: She has a normal mood and affect. Her behavior is normal. Judgment and thought content normal.        Cervix:  Dilation:  6  Effacement:  75%  Station: -2  Presentation: Vertex     Significant Labs:  Lab Results   Component Value Date    GROUPTRH A NEG 2021    HEPBSAG Negative 2021    STREPBCULT (A) 2021     STREPTOCOCCUS AGALACTIAE (GROUP B)  In case of  Penicillin allergy, call lab for further testing.  Beta-hemolytic streptococci are routinely susceptible to   penicillins,cephalosporins and carbapenems.         I have personallly reviewed all pertinent lab results from the last 24 hours.  Recent Lab Results       None          Assessment/Plan:     39 y.o. female  at 40w5d for:    * Normal labor  SVE /-2  Intact  GBS pos - declines abx.   Desires minimal interventions and unmedicated birth  Expect vaginal delivery    Rubella non-immune status, antepartum  Discussed MMR   Declines MMR Post partum    GBS (group B Streptococcus carrier), +RV culture, currently pregnant  Declines abx in labor if no SROM prior to delivery  Risks and benefits of abx prophylaxis for GBS discussed at length.     Labor. Pt requests min interventions and has had multiple visits and discussions regarding risks and recommended interventions. Refuses GBS prophylaxis. Did not receive Rhogam. I am available and present on unit.     Rh negative state in antepartum period, third trimester  Risk vs benefits of RhoGam discussed at length.  Declines RhoGam        Itzel Marcelo CNM  Obstetrics  [facility]

## 2024-08-08 NOTE — NURSING
[facility]  Department of OBGYN  OB Summary        PATIENT NAME: Adelaida Agarwal                                                                                                                                                                       AGE: 39 y.o.                                                                                          MRN: [id number]  PATIENT LOCATION:  [facility]/2309A  ADMIT DATE: 2024  TODAY'S DATE: 2024 Hospital Day: 2  TIRSO: Estimated Date of Delivery: 24  GA: 40w6d     OB HISTORY:    OB History    Para Term  AB Living   4 2 2   1 2   SAB IAB Ectopic Multiple Live Births     1   0 2      # Outcome Date GA Lbr Drainel/2nd Weight Sex Type Anes PTL Lv   4 Current            3 Term 21 40w1d  3.62 kg (7 lb 15.7 oz) F Vag-Spont None N DIANDRA   2 Term 20 41w0d  3.402 kg (7 lb 8 oz) M  None N DIANDRA      Birth Comments: no comlpications   1 IAB                PRE-PROCEDURE DIAGNOSIS: Normal labor    PROCEDURE:   * No surgery found *       MATERNAL LABS   Lab Results   Component Value Date    GROUPTRH A NEG 2024    HGB 11.5 (L) 2024    HCT 33.7 (L) 2024     2024    RUBELLAIMMUN non immune 2024    HEPBSAG Negative 2024    [id number] non reactive 2024    RPR non reactive 2024    OBGLUCOSESCR 123 2024    STREPBCULT positive 2024       Fentanyl, Urine   Date Value Ref Range Status   2024 Negative @ANAYELI Negative Final     Comment:     The cut-off value is 5.0 ng/mL. This is a screening test. If results   do not   correlate with clinical presentation then a confirmatory send out   test is   advised. This result is intended for use in clinical management. It   is not   intended for use in employment related testing.         Benzodiazepines   Date Value Ref Range Status   2024 Negative Negative Final     Cocaine (Metab.)   Date Value Ref Range Status   2024  Negative Negative Final     Opiate Scrn, Ur   Date Value Ref Range Status   08/07/2024 Negative Negative Final     Barbiturate Screen, Ur   Date Value Ref Range Status   08/07/2024 Negative Negative Final     Amphetamine Screen, Ur   Date Value Ref Range Status   08/07/2024 Negative Negative Final     THC   Date Value Ref Range Status   08/07/2024 Negative Negative Final     Phencyclidine   Date Value Ref Range Status   08/07/2024 Negative Negative Final     BUPRENORPHINE   Date Value Ref Range Status   08/07/2024 Negative  Final     Comment:     Buprenorphine urine screening threshold: 5 ng/mL.    This report is intended for use in clinical monitoring and management   of   patients only.          SPECIMENS  Specimen (24h ago, onward)      None             Antibiotics (From admission, onward)      None            INPATIENT MEDICATIONS  Current Facility-Administered Medications   Medication Dose Route Frequency Provider Last Rate Last Admin    0.9%  NaCl infusion   Intra-Catheter PRN Itzel Marcelo CNM        calcium carbonate 200 mg calcium (500 mg) chewable tablet 500 mg  500 mg Oral TID PRN Itzel Marcelo CNM        carboprost injection 250 mcg  250 mcg Intramuscular Q15 Min PRN Itzel Marcelo CNM        diphenoxylate-atropine 2.5-0.025 mg per tablet 2 tablet  2 tablet Oral Q6H PRN Itzel Marcelo CNM        lactated ringers bolus 1,000 mL  1,000 mL Intravenous PRN Itzel Marcelo CNM        lactated ringers bolus 500 mL  500 mL Intravenous PRN Itzel Marcelo CNM        lactated ringers infusion   Intravenous Continuous Itzel Marcelo CNM        LIDOcaine HCL 10 mg/ml (1%) injection 10 mL  10 mL Intradermal Once PRN Itzel Marcelo CNM        methylergonovine injection 200 mcg  200 mcg Intramuscular Once PRN Itzel Marcelo CNM        miSOPROStoL tablet 800 mcg  800 mcg Rectal Once PRN Itzel Marcelo CNM        miSOPROStoL tablet 800 mcg  800 mcg Oral Once  PRN Itzel Marcelo CNM        ondansetron disintegrating tablet 8 mg  8 mg Oral Q8H PRN Itzel Marcelo CNM        oxytocin 30 units in 500 mL normal saline infusion (loading dose)  10.0002 Units Intravenous Once Itzel Marcelo CNM        oxytocin 30 units in 500 mL normal saline infusion (loading dose)  10.0002 Units Intravenous Once PRN Itzel Marcelo CNM        oxytocin 30 units/500 mL (60 milliunits/mL) in 0.9% NaCl (TITRATING)  95 babak-units/min Intravenous Once Itzel Marcelo CNM        oxytocin 30 units/500 mL (60 milliunits/mL) in 0.9% NaCl (TITRATING)  95 babak-units/min Intravenous Once PRN Itzel Marcelo CNM        oxytocin injection 10 Units  10 Units Intramuscular Once PRN Itzel Marcelo CNM        simethicone chewable tablet 80 mg  1 tablet Oral QID PRN Itzel Marcelo CNM        tranexamic acid in NaCl,iso-os IVPB 1,000 mg  1,000 mg Intravenous Q30 Min PRN Itzel Marcelo CNM           OUTPATIENT MEDICATIONS  Current Outpatient Medications   Medication Instructions    docusate sodium (COLACE) 200 mg, Oral, 2 times daily PRN       VITAL SIGNS (Most Recent)     No data recorded.      This patient has no babies on file.    Time ruptured: rupture date, rupture time, delivery date, or delivery time have not been documented    SKIN TO SKIN                INFANT FEEDING       PAST MEDICAL HISTORY   Past Medical History:   Diagnosis Date    Tobacco use     Past, quit         PAST SURGICAL HISTORY    Past Surgical History:   Procedure Laterality Date    skin cyst      removal, not cancer    TONSILLECTOMY      as a child    WISDOM TOOTH EXTRACTION      No complications        SOCIAL HISTORY    Social History     Tobacco Use    Smoking status: Former     Current packs/day: 0.00     Types: Cigarettes     Quit date: 2019     Years since quittin.2    Smokeless tobacco: Never    Tobacco comments:     on chantix, not any longer   Substance Use  Topics    Alcohol use: Not Currently     Comment: socially, not when pregnant    Drug use: Never                     Loida Mendez RN  Date of Service: 08/08/2024  2:25 AM

## 2024-08-08 NOTE — ASSESSMENT & PLAN NOTE
Spontaneous onset of labor. Pt requests min interventions and has had multiple visits and discussions regarding risks and recommended interventions. Declines GBS prophylaxis. Did not receive Rhogam. I am available and present on unit.   SVE 8.5/85/-2  Intact  GBS pos - declines abx.   Intermittent monitoring  Desires minimal interventions and unmedicated birth  Expect vaginal delivery

## 2024-08-08 NOTE — ASSESSMENT & PLAN NOTE
Doing well  SVE 10/100/0  SROM - mec stained fluid  GONSALO in place. Pain well controlled  Expect vaginal delivery

## 2024-08-08 NOTE — HPI
39 year old  at 40.5 presents in spontaneous labor. Contractions began last night. Has been laboring at home until contractions felt more like active labor. GBS + - declines abx prophylaxis.

## 2024-08-08 NOTE — SUBJECTIVE & OBJECTIVE
Interval History:  Adelaida is a 39 y.o.  at 40w5d. She is doing well. Coping well with cont. labor support and birthing ball.    Objective:     Vital Signs (Most Recent):    Vital Signs (24h Range):  Pulse:  [94] 94  BP: (131)/(73) 131/73        There is no height or weight on file to calculate BMI.    FHT: 135 Cat by intermittent monitoring  TOCO:  Q 3-4 minutes, strong by palpation    Cervical Exam:  Dilation:  8  Effacement:  90  Station: -2  Presentation: Vertex     Significant Labs:  Lab Results   Component Value Date    GROUPTRH A NEG 2021    HEPBSAG Negative 2021    STREPBCULT (A) 2021     STREPTOCOCCUS AGALACTIAE (GROUP B)  In case of Penicillin allergy, call lab for further testing.  Beta-hemolytic streptococci are routinely susceptible to   penicillins,cephalosporins and carbapenems.         I have personallly reviewed all pertinent lab results from the last 24 hours.  Recent Lab Results         24  2235   24  2158        Benzodiazepines   Negative       Phencyclidine   Negative       BUPRENORPHINE   Negative  Comment: Buprenorphine urine screening threshold: 5 ng/mL.    This report is intended for use in clinical monitoring and management   of   patients only.          Amphetamines, Urine   Negative       Barbituates, Urine   Negative       Baso # 0.02         Basophil % 0.1         Cocaine, Urine   Negative       Urine Creatinine   58.5  Comment: The random urine reference ranges provided were established   for 24 hour urine collections.  No reference ranges exist for  random urine specimens.  Correlate clinically.            58.5  Comment: The random urine reference ranges provided were established   for 24 hour urine collections.  No reference ranges exist for  random urine specimens.  Correlate clinically.            58.5  Comment: The random urine reference ranges provided were established   for 24 hour urine collections.  No reference ranges exist for  random  urine specimens.  Correlate clinically.         Differential Method Automated         Eos # 0.1         Eos % 0.7         Fentanyl, Urine   Negative @ANAYELI  Comment: The cut-off value is 5.0 ng/mL. This is a screening test. If results   do not   correlate with clinical presentation then a confirmatory send out   test is   advised. This result is intended for use in clinical management. It   is not   intended for use in employment related testing.         Gran # (ANC) 12.3         Gran % 78.0         Hematocrit 33.7         Hemoglobin 11.5         Immature Grans (Abs) 0.11  Comment: Mild elevation in immature granulocytes is non specific and   can be seen in a variety of conditions including stress response,   acute inflammation, trauma and pregnancy. Correlation with other   laboratory and clinical findings is essential.           Immature Granulocytes 0.7         Lymph # 2.1         Lymph % 13.3         MCH 29.0         MCHC 34.1         MCV 85         Mono # 1.1         Mono % 7.2         MPV 10.5         nRBC 0         Opiates, Urine   Negative       Platelet Count 258         RBC 3.96         RDW 14.9         Marijuana (THC) Metabolite   Negative       Toxicology Information   SEE COMMENT  Comment: This screen includes the following classes of drugs at the   listed cut-off:    Benzodiazepines                  200 ng/ml  Cocaine metabolite               300 ng/ml  Opiates                          300 ng/ml  Barbiturates                     200 ng/ml  Amphetamines                    1000 ng/ml  Marijuana metabs (THC)            50 ng/ml  Phencyclidine (PCP)               25 ng/ml    High concentrations of Methylenedioxymethamphetamine (MDMA aka  Ectasy) and other structurally similar compounds may cross-   react with the Amphetamine/Methamphetamine screening   immunoassay giving a false positive result.    Note: This exception list includes only more common   interferants in toxicology screen testing.  Because of  many   cross-reactantspositive results on toxicology drug screens   should be confirmed whenever results do not correlate with   clinical presentation.    This report is intended for use in clinical monitoring and  management of patients. It is not intended for use in   employment related drug testing.    Because of any cross-reactants, positive results on toxicology  drug screens should be confirmed whenever results do not  correlate with clinical presentation.    Presumptive positive results are unconfirmed and may be used   only for medical purposes.         WBC 15.78                 Physical Exam:                           Neurological: She is alert.     Psychiatric: She has a normal mood and affect. Her behavior is normal. Judgment and thought content normal.       Review of Systems

## 2024-08-08 NOTE — PROGRESS NOTES
[facility]  Obstetrics  Labor Progress Note    Patient Name: Adelaida Agarwal  MRN: [id number]  Admission Date: 2024  Hospital Length of Stay: 1 days  Attending Physician: Loyda Cunningham MD  Primary Care Provider: Aorn Barrera Jr., MD    Subjective:     Principal Problem:Normal labor    Hospital Course:  SVE 6.5/70/-2 on arrival, intact. Desires NCB.    24 - Recognized fatigue and request discussion regarding epidural. Questions answered. Anesthesia notified per RN of patient request.    Interval History:  Adelaida is a 39 y.o.  at 40w6d. She is doing well. GONSALO in place. Pitocin infusing per protocol    Objective:     Vital Signs (Most Recent):  Temp: 97.3 °F (36.3 °C) (24 1429)  Pulse: 104 (24 1808)  Resp: 18 (24 1600)  BP: 114/81 (24 1808)  SpO2: 97 % (24 1306) Vital Signs (24h Range):  Temp:  [97.3 °F (36.3 °C)-98.6 °F (37 °C)] 97.3 °F (36.3 °C)  Pulse:  [] 104  Resp:  [18-20] 18  SpO2:  [92 %-99 %] 97 %  BP: (107-147)/(55-92) 114/81        There is no height or weight on file to calculate BMI.    FHT: 140 Cat 2 (Variable decel)  TOCO:  Q 2-4 minutes    Cervical Exam:  Dilation:  10  Effacement:  100%  Station: 0  Presentation: Vertex     Significant Labs:  Lab Results   Component Value Date    GROUPTRH A NEG 2024    HEPBSAG Negative 2024    STREPBCULT positive 2024       I have personallly reviewed all pertinent lab results from the last 24 hours.  Recent Lab Results         24        Benzodiazepines   Negative       Phencyclidine   Negative       BUPRENORPHINE   Negative  Comment: Buprenorphine urine screening threshold: 5 ng/mL.    This report is intended for use in clinical monitoring and management   of   patients only.          Amphetamines, Urine   Negative       Barbituates, Urine   Negative       Baso # 0.02         Basophil % 0.1         Cocaine, Urine   Negative       Urine  Creatinine   58.5  Comment: The random urine reference ranges provided were established   for 24 hour urine collections.  No reference ranges exist for  random urine specimens.  Correlate clinically.            58.5  Comment: The random urine reference ranges provided were established   for 24 hour urine collections.  No reference ranges exist for  random urine specimens.  Correlate clinically.            58.5  Comment: The random urine reference ranges provided were established   for 24 hour urine collections.  No reference ranges exist for  random urine specimens.  Correlate clinically.         Differential Method Automated         Eos # 0.1         Eos % 0.7         Fentanyl, Urine   Negative @ANAYELI  Comment: The cut-off value is 5.0 ng/mL. This is a screening test. If results   do not   correlate with clinical presentation then a confirmatory send out   test is   advised. This result is intended for use in clinical management. It   is not   intended for use in employment related testing.         Gran # (ANC) 12.3         Gran % 78.0         Group & Rh A NEG         Hematocrit 33.7         Hemoglobin 11.5         Immature Grans (Abs) 0.11  Comment: Mild elevation in immature granulocytes is non specific and   can be seen in a variety of conditions including stress response,   acute inflammation, trauma and pregnancy. Correlation with other   laboratory and clinical findings is essential.           Immature Granulocytes 0.7         INDIRECT ALONDRA NEG         Lymph # 2.1         Lymph % 13.3         MCH 29.0         MCHC 34.1         MCV 85         Mono # 1.1         Mono % 7.2         MPV 10.5         nRBC 0         Opiates, Urine   Negative       Platelet Count 258         RBC 3.96         RDW 14.9         Marijuana (THC) Metabolite   Negative       Toxicology Information   SEE COMMENT  Comment: This screen includes the following classes of drugs at the   listed cut-off:    Benzodiazepines                  200  ng/ml  Cocaine metabolite               300 ng/ml  Opiates                          300 ng/ml  Barbiturates                     200 ng/ml  Amphetamines                    1000 ng/ml  Marijuana metabs (THC)            50 ng/ml  Phencyclidine (PCP)               25 ng/ml    High concentrations of Methylenedioxymethamphetamine (MDMA aka  Ectasy) and other structurally similar compounds may cross-   react with the Amphetamine/Methamphetamine screening   immunoassay giving a false positive result.    Note: This exception list includes only more common   interferants in toxicology screen testing.  Because of many   cross-reactantspositive results on toxicology drug screens   should be confirmed whenever results do not correlate with   clinical presentation.    This report is intended for use in clinical monitoring and  management of patients. It is not intended for use in   employment related drug testing.    Because of any cross-reactants, positive results on toxicology  drug screens should be confirmed whenever results do not  correlate with clinical presentation.    Presumptive positive results are unconfirmed and may be used   only for medical purposes.         WBC 15.78                 Physical Exam    Review of Systems  Assessment/Plan:     39 y.o. female  at 40w6d for:    * Normal labor  Doing well  SVE 10/100/0  SROM - mec stained fluid  GONSALO in place. Pain well controlled  Expect vaginal delivery    Rubella non-immune status, antepartum  Discussed MMR   Declines MMR Post partum    GBS (group B Streptococcus carrier), +RV culture, currently pregnant  SROM with SVE - meconium stained fluid  PCN allergic, Clindamycin resistant  Discussed benefits vs risks of GBS prophylaxis  Will start Vancomycin for GBS prophylaxis        Rh negative state in antepartum period, third trimester  Risk vs benefits of RhoGam discussed at length.  Declines RhoGam          Itzel Marcelo CNM  Obstetrics  [facility]  [facility]

## 2024-08-08 NOTE — PROGRESS NOTES
[facility]  Obstetrics  Labor Progress Note    Patient Name: Adelaida Agarwal  MRN: [id number]  Admission Date: 2024  Hospital Length of Stay: 1 days  Attending Physician: Loyda Cunningham MD  Primary Care Provider: Aron Barrera Jr., MD    Subjective:     Principal Problem:Normal labor    Hospital Course:  SVE 6.5/70/-2 on arrival, intact. Desires NCB.    No new subjective & objective note has been filed under this hospital service since the last note was generated.    Assessment/Plan:     39 y.o. female  at 40w6d for:    * Normal labor  Spontaneous onset of labor. Pt requests min interventions and has had multiple visits and discussions regarding risks and recommended interventions. Declines GBS prophylaxis. Did not receive Rhogam. I am available and present on unit.   SVE 8.5/85/-2  Intact  GBS pos - declines abx.   Intermittent monitoring  Desires minimal interventions and unmedicated birth  Expect vaginal delivery    Rubella non-immune status, antepartum  Discussed MMR   Declines MMR Post partum    GBS (group B Streptococcus carrier), +RV culture, currently pregnant  Declines abx in labor if no SROM prior to delivery  Risks and benefits of abx prophylaxis for GBS discussed at length.         Rh negative state in antepartum period, third trimester  Risk vs benefits of RhoGam discussed at length.  Declines RhoGam          Itzel Marcelo CNM  Obstetrics  [facility]

## 2024-08-08 NOTE — PROGRESS NOTES
[facility]  Obstetrics  Labor Progress Note    Patient Name: Adelaida Agarwal  MRN: [id number]  Admission Date: 2024  Hospital Length of Stay: 1 days  Attending Physician: Loyda Cunningham MD  Primary Care Provider: Aron Barrera Jr., MD    Subjective:     Principal Problem:Normal labor    Hospital Course:  SVE 6.5/70/-2 on arrival, intact. Desires NCB.    No new subjective & objective note has been filed under this hospital service since the last note was generated.    Assessment/Plan:     39 y.o. female  at 40w6d for:    * Normal labor  Spontaneous onset of labor. Pt requests min interventions and has had multiple visits and discussions regarding risks and recommended interventions. Declines GBS prophylaxis. Did not receive Rhogam. I am available and present on unit.   SVE 90/-2 Moderate to strong contractions - remains unchanged  Discussed maternal exhaustion and pitocin.  Will start Pit per protocol  GBS pos - declines abx.   Cont. Monitoring  Will recheck in 2 hours  Expect vaginal delivery    Rubella non-immune status, antepartum  Discussed MMR   Declines MMR Post partum    GBS (group B Streptococcus carrier), +RV culture, currently pregnant  Declines abx in labor if no SROM prior to delivery  Risks and benefits of abx prophylaxis for GBS discussed at length.         Rh negative state in antepartum period, third trimester  Risk vs benefits of RhoGam discussed at length.  Declines RhoGam          Itzel Marcelo CNM  Obstetrics  [facility]

## 2024-08-08 NOTE — PLAN OF CARE
Discussed intemittent monitoring and need for assessment at periodic times, patient not wanting interventions, such as medications, monitoring .

## 2024-08-08 NOTE — PROGRESS NOTES
[facility]  Obstetrics  Labor Progress Note    Patient Name: Adelaida Agarwal  MRN: [id number]  Admission Date: 2024  Hospital Length of Stay: 0 days  Attending Physician: Loyda Cunningham MD  Primary Care Provider: Aron Barrera Jr., MD    Subjective:     Principal Problem:Normal labor    Hospital Course:  SVE 6.5/70/-2 on arrival, intact. Desires NCB.    Interval History:  Adelaida is a 39 y.o.  at 40w5d. She is doing well. Coping well with cont. labor support and birthing ball.    Objective:     Vital Signs (Most Recent):    Vital Signs (24h Range):  Pulse:  [94] 94  BP: (131)/(73) 131/73        There is no height or weight on file to calculate BMI.    FHT: 135 Cat by intermittent monitoring  TOCO:  Q 3-4 minutes, strong by palpation    Cervical Exam:  Dilation:  8  Effacement:  90  Station: -2  Presentation: Vertex     Significant Labs:  Lab Results   Component Value Date    GROUPTRH A NEG 2021    HEPBSAG Negative 2021    STREPBCULT (A) 2021     STREPTOCOCCUS AGALACTIAE (GROUP B)  In case of Penicillin allergy, call lab for further testing.  Beta-hemolytic streptococci are routinely susceptible to   penicillins,cephalosporins and carbapenems.         I have personallly reviewed all pertinent lab results from the last 24 hours.  Recent Lab Results         24  2235   24  2158        Benzodiazepines   Negative       Phencyclidine   Negative       BUPRENORPHINE   Negative  Comment: Buprenorphine urine screening threshold: 5 ng/mL.    This report is intended for use in clinical monitoring and management   of   patients only.          Amphetamines, Urine   Negative       Barbituates, Urine   Negative       Baso # 0.02         Basophil % 0.1         Cocaine, Urine   Negative       Urine Creatinine   58.5  Comment: The random urine reference ranges provided were established   for 24 hour urine collections.  No reference ranges exist for  random urine  specimens.  Correlate clinically.            58.5  Comment: The random urine reference ranges provided were established   for 24 hour urine collections.  No reference ranges exist for  random urine specimens.  Correlate clinically.            58.5  Comment: The random urine reference ranges provided were established   for 24 hour urine collections.  No reference ranges exist for  random urine specimens.  Correlate clinically.         Differential Method Automated         Eos # 0.1         Eos % 0.7         Fentanyl, Urine   Negative @ANAYELI  Comment: The cut-off value is 5.0 ng/mL. This is a screening test. If results   do not   correlate with clinical presentation then a confirmatory send out   test is   advised. This result is intended for use in clinical management. It   is not   intended for use in employment related testing.         Gran # (ANC) 12.3         Gran % 78.0         Hematocrit 33.7         Hemoglobin 11.5         Immature Grans (Abs) 0.11  Comment: Mild elevation in immature granulocytes is non specific and   can be seen in a variety of conditions including stress response,   acute inflammation, trauma and pregnancy. Correlation with other   laboratory and clinical findings is essential.           Immature Granulocytes 0.7         Lymph # 2.1         Lymph % 13.3         MCH 29.0         MCHC 34.1         MCV 85         Mono # 1.1         Mono % 7.2         MPV 10.5         nRBC 0         Opiates, Urine   Negative       Platelet Count 258         RBC 3.96         RDW 14.9         Marijuana (THC) Metabolite   Negative       Toxicology Information   SEE COMMENT  Comment: This screen includes the following classes of drugs at the   listed cut-off:    Benzodiazepines                  200 ng/ml  Cocaine metabolite               300 ng/ml  Opiates                          300 ng/ml  Barbiturates                     200 ng/ml  Amphetamines                    1000 ng/ml  Marijuana metabs (THC)            50  ng/ml  Phencyclidine (PCP)               25 ng/ml    High concentrations of Methylenedioxymethamphetamine (MDMA aka  Ectasy) and other structurally similar compounds may cross-   react with the Amphetamine/Methamphetamine screening   immunoassay giving a false positive result.    Note: This exception list includes only more common   interferants in toxicology screen testing.  Because of many   cross-reactantspositive results on toxicology drug screens   should be confirmed whenever results do not correlate with   clinical presentation.    This report is intended for use in clinical monitoring and  management of patients. It is not intended for use in   employment related drug testing.    Because of any cross-reactants, positive results on toxicology  drug screens should be confirmed whenever results do not  correlate with clinical presentation.    Presumptive positive results are unconfirmed and may be used   only for medical purposes.         WBC 15.78                 Physical Exam:                           Neurological: She is alert.     Psychiatric: She has a normal mood and affect. Her behavior is normal. Judgment and thought content normal.       Review of Systems  Assessment/Plan:     39 y.o. female  at 40w5d for:    * Normal labor  SVE /-2  Intact  GBS pos - declines abx.   Intermittent monitoring  Desires minimal interventions and unmedicated birth  Expect vaginal delivery    Rubella non-immune status, antepartum  Discussed MMR   Declines MMR Post partum    GBS (group B Streptococcus carrier), +RV culture, currently pregnant  Declines abx in labor if no SROM prior to delivery  Risks and benefits of abx prophylaxis for GBS discussed at length.     Labor. Pt requests min interventions and has had multiple visits and discussions regarding risks and recommended interventions. Refuses GBS prophylaxis. Did not receive Rhogam. I am available and present on unit.     Rh negative state in antepartum  period, third trimester  Risk vs benefits of RhoGam discussed at length.  Declines RhoGam          Itzel Marcelo CNM  Obstetrics  [facility]

## 2024-08-08 NOTE — SUBJECTIVE & OBJECTIVE
Obstetric HPI:  39  presents in labor for CNM. I am covering and available physicia. This pregnancy has been complicated by obesity, AMA    OB History    Para Term  AB Living   4 2 2 0 1 2   SAB IAB Ectopic Multiple Live Births   0 1 0 0 2      # Outcome Date GA Lbr Darinel/2nd Weight Sex Type Anes PTL Lv   4 Current            3 Term 21 40w1d  3.62 kg (7 lb 15.7 oz) F Vag-Spont None N DIANDRA      Name: Marybel      Apgar1: 9  Apgar5: 9   2 Term 20 41w0d  3.402 kg (7 lb 8 oz) M  None N DIANDRA      Birth Comments: no comlpications      Name: Antony Brothers IAB              Past Medical History:   Diagnosis Date    Tobacco use     Past, quit      Past Surgical History:   Procedure Laterality Date    skin cyst      removal, not cancer    TONSILLECTOMY      as a child    WISDOM TOOTH EXTRACTION      No complications       PTA Medications   Medication Sig    docusate sodium (COLACE) 100 MG capsule Take 2 capsules (200 mg total) by mouth 2 (two) times daily as needed for Constipation.       Review of patient's allergies indicates:   Allergen Reactions    Penicillins Rash and Other (See Comments)    Rhogam ultra-filtered [rho(d) immune globulin] Rash        Family History       Problem Relation (Age of Onset)    Alcohol abuse Paternal Grandfather (40)    Cancer Paternal Grandmother (69)    Cirrhosis Paternal Grandfather    Diabetes Maternal Grandmother    Hyperlipidemia Father    No Known Problems Mother, Maternal Grandfather, Sister, Brother, Sister, Brother          Tobacco Use    Smoking status: Former     Current packs/day: 0.00     Types: Cigarettes     Quit date: 2019     Years since quittin.2    Smokeless tobacco: Never    Tobacco comments:     on chantix, not any longer   Substance and Sexual Activity    Alcohol use: Not Currently     Comment: socially, not when pregnant    Drug use: Never    Sexual activity: Yes     Partners: Male     Birth control/protection: None      Comment: - Antony     Review of Systems   Constitutional: Negative.    Genitourinary:  Negative for vaginal bleeding.      Objective:     Vital Signs (Most Recent):    Vital Signs (24h Range):  Pulse:  [94] 94  BP: (131)/(73) 131/73        There is no height or weight on file to calculate BMI.    FHT: 130 Cat 1 (reassuring)  TOCO:  Q 5-6 minutes     Physical Exam:   Constitutional: She is oriented to person, place, and time. She appears well-developed and well-nourished.    HENT:   Head: Normocephalic.      Cardiovascular:  Normal rate, regular rhythm, normal heart sounds and intact distal pulses.           Pulse Score: 2+   Pulmonary/Chest: Effort normal and breath sounds normal.        Abdominal: Soft. Bowel sounds are normal.     Genitourinary:    Vagina and uterus normal.   No vaginal discharge (Denies LOF) in the vagina.           Musculoskeletal: Normal range of motion and moves all extremeties.       Neurological: She is alert and oriented to person, place, and time.    Skin: Skin is warm and dry.    Psychiatric: She has a normal mood and affect. Her behavior is normal. Judgment and thought content normal.        Cervix:  Dilation:  6  Effacement:  75%  Station: -2  Presentation: Vertex     Significant Labs:  Lab Results   Component Value Date    GROUPTRH A NEG 09/29/2021    HEPBSAG Negative 04/27/2021    STREPBCULT (A) 09/14/2021     STREPTOCOCCUS AGALACTIAE (GROUP B)  In case of Penicillin allergy, call lab for further testing.  Beta-hemolytic streptococci are routinely susceptible to   penicillins,cephalosporins and carbapenems.         I have personallly reviewed all pertinent lab results from the last 24 hours.  Recent Lab Results       None

## 2024-08-08 NOTE — SUBJECTIVE & OBJECTIVE
Obstetric HPI:  Patient reports contractions that have gotten stronger and closer together over the last 24 hours, active fetal movement, No vaginal bleeding , No loss of fluid     This pregnancy has been complicated by AMA, obesity    OB History    Para Term  AB Living   4 2 2 0 1 2   SAB IAB Ectopic Multiple Live Births   0 1 0 0 2      # Outcome Date GA Lbr Darinel/2nd Weight Sex Type Anes PTL Lv   4 Current            3 Term 21 40w1d  3.62 kg (7 lb 15.7 oz) F Vag-Spont None N DIANDRA      Name: Marybel      Apgar1: 9  Apgar5: 9   2 Term 20 41w0d  3.402 kg (7 lb 8 oz) M  None N DIANDRA      Birth Comments: no comlpications      Name: Antony Brothers IAB              Past Medical History:   Diagnosis Date    Tobacco use     Past, quit      Past Surgical History:   Procedure Laterality Date    skin cyst      removal, not cancer    TONSILLECTOMY      as a child    WISDOM TOOTH EXTRACTION      No complications       PTA Medications   Medication Sig    docusate sodium (COLACE) 100 MG capsule Take 2 capsules (200 mg total) by mouth 2 (two) times daily as needed for Constipation.       Review of patient's allergies indicates:   Allergen Reactions    Penicillins Rash and Other (See Comments)    Rhogam ultra-filtered [rho(d) immune globulin] Rash        Family History       Problem Relation (Age of Onset)    Alcohol abuse Paternal Grandfather (40)    Cancer Paternal Grandmother (69)    Cirrhosis Paternal Grandfather    Diabetes Maternal Grandmother    Hyperlipidemia Father    No Known Problems Mother, Maternal Grandfather, Sister, Brother, Sister, Brother          Tobacco Use    Smoking status: Former     Current packs/day: 0.00     Types: Cigarettes     Quit date: 2019     Years since quittin.2    Smokeless tobacco: Never    Tobacco comments:     on chantix, not any longer   Substance and Sexual Activity    Alcohol use: Not Currently     Comment: socially, not when pregnant    Drug use: Never     Sexual activity: Yes     Partners: Male     Birth control/protection: None     Comment: - Antony     Review of Systems   Objective:     Vital Signs (Most Recent):    Vital Signs (24h Range):  Pulse:  [94] 94  BP: (131)/(73) 131/73        There is no height or weight on file to calculate BMI.    FHT: 135 Cat 1 (reassuring)  TOCO:  Q 5-6 minutes     Physical Exam:   Constitutional: She is oriented to person, place, and time. She appears well-developed and well-nourished.    HENT:   Head: Normocephalic and atraumatic.    Eyes: Pupils are equal, round, and reactive to light. EOM are normal.     Cardiovascular:  Normal rate and regular rhythm.             Pulmonary/Chest: Effort normal and breath sounds normal.        Abdominal: Soft. Bowel sounds are normal.     Genitourinary:    Vagina and uterus normal.   No vaginal discharge in the vagina.           Musculoskeletal: Normal range of motion and moves all extremeties.       Neurological: She is alert and oriented to person, place, and time. She has normal reflexes.    Skin: Skin is warm and dry.    Psychiatric: She has a normal mood and affect. Her behavior is normal. Judgment and thought content normal.        Cervix:  Dilation:  6  Effacement:  75%  Station: -2  Presentation: Vertex     Significant Labs:  Lab Results   Component Value Date    GROUPTRH A NEG 09/29/2021    HEPBSAG Negative 04/27/2021    STREPBCULT (A) 09/14/2021     STREPTOCOCCUS AGALACTIAE (GROUP B)  In case of Penicillin allergy, call lab for further testing.  Beta-hemolytic streptococci are routinely susceptible to   penicillins,cephalosporins and carbapenems.         I have personallly reviewed all pertinent lab results from the last 24 hours.  Recent Lab Results       None

## 2024-08-08 NOTE — ASSESSMENT & PLAN NOTE
SROM with SVE - meconium stained fluid  PCN allergic, Clindamycin resistant  Discussed benefits vs risks of GBS prophylaxis  Will start Vancomycin for GBS prophylaxis

## 2024-08-08 NOTE — SUBJECTIVE & OBJECTIVE
Interval History:  Adelaida is a 39 y.o.  at 40w6d. She is doing well. GONSALO in place. Pitocin infusing per protocol    Objective:     Vital Signs (Most Recent):  Temp: 97.3 °F (36.3 °C) (24 1429)  Pulse: 104 (24 1808)  Resp: 18 (24 1600)  BP: 114/81 (24 1808)  SpO2: 97 % (24 1306) Vital Signs (24h Range):  Temp:  [97.3 °F (36.3 °C)-98.6 °F (37 °C)] 97.3 °F (36.3 °C)  Pulse:  [] 104  Resp:  [18-20] 18  SpO2:  [92 %-99 %] 97 %  BP: (107-147)/(55-92) 114/81        There is no height or weight on file to calculate BMI.    FHT: 140 Cat 2 (Variable decel)  TOCO:  Q 2-4 minutes    Cervical Exam:  Dilation:  10  Effacement:  100%  Station: 0  Presentation: Vertex     Significant Labs:  Lab Results   Component Value Date    GROUPTRH A NEG 2024    HEPBSAG Negative 2024    STREPBCULT positive 2024       I have personallly reviewed all pertinent lab results from the last 24 hours.  Recent Lab Results         24  2235   24  2158        Benzodiazepines   Negative       Phencyclidine   Negative       BUPRENORPHINE   Negative  Comment: Buprenorphine urine screening threshold: 5 ng/mL.    This report is intended for use in clinical monitoring and management   of   patients only.          Amphetamines, Urine   Negative       Barbituates, Urine   Negative       Baso # 0.02         Basophil % 0.1         Cocaine, Urine   Negative       Urine Creatinine   58.5  Comment: The random urine reference ranges provided were established   for 24 hour urine collections.  No reference ranges exist for  random urine specimens.  Correlate clinically.            58.5  Comment: The random urine reference ranges provided were established   for 24 hour urine collections.  No reference ranges exist for  random urine specimens.  Correlate clinically.            58.5  Comment: The random urine reference ranges provided were established   for 24 hour urine collections.  No reference  ranges exist for  random urine specimens.  Correlate clinically.         Differential Method Automated         Eos # 0.1         Eos % 0.7         Fentanyl, Urine   Negative @ANAYELI  Comment: The cut-off value is 5.0 ng/mL. This is a screening test. If results   do not   correlate with clinical presentation then a confirmatory send out   test is   advised. This result is intended for use in clinical management. It   is not   intended for use in employment related testing.         Gran # (ANC) 12.3         Gran % 78.0         Group & Rh A NEG         Hematocrit 33.7         Hemoglobin 11.5         Immature Grans (Abs) 0.11  Comment: Mild elevation in immature granulocytes is non specific and   can be seen in a variety of conditions including stress response,   acute inflammation, trauma and pregnancy. Correlation with other   laboratory and clinical findings is essential.           Immature Granulocytes 0.7         INDIRECT ALONDRA NEG         Lymph # 2.1         Lymph % 13.3         MCH 29.0         MCHC 34.1         MCV 85         Mono # 1.1         Mono % 7.2         MPV 10.5         nRBC 0         Opiates, Urine   Negative       Platelet Count 258         RBC 3.96         RDW 14.9         Marijuana (THC) Metabolite   Negative       Toxicology Information   SEE COMMENT  Comment: This screen includes the following classes of drugs at the   listed cut-off:    Benzodiazepines                  200 ng/ml  Cocaine metabolite               300 ng/ml  Opiates                          300 ng/ml  Barbiturates                     200 ng/ml  Amphetamines                    1000 ng/ml  Marijuana metabs (THC)            50 ng/ml  Phencyclidine (PCP)               25 ng/ml    High concentrations of Methylenedioxymethamphetamine (MDMA aka  Ectasy) and other structurally similar compounds may cross-   react with the Amphetamine/Methamphetamine screening   immunoassay giving a false positive result.    Note: This exception list  includes only more common   interferants in toxicology screen testing.  Because of many   cross-reactantspositive results on toxicology drug screens   should be confirmed whenever results do not correlate with   clinical presentation.    This report is intended for use in clinical monitoring and  management of patients. It is not intended for use in   employment related drug testing.    Because of any cross-reactants, positive results on toxicology  drug screens should be confirmed whenever results do not  correlate with clinical presentation.    Presumptive positive results are unconfirmed and may be used   only for medical purposes.         WBC 15.78                 Physical Exam    Review of Systems

## 2024-08-08 NOTE — ASSESSMENT & PLAN NOTE
Spontaneous onset of labor. Pt requests min interventions and has had multiple visits and discussions regarding risks and recommended interventions. Declines GBS prophylaxis. Did not receive Rhogam. I am available and present on unit.   SVE 9/90/-2 Moderate to strong contractions  Discussed AROM vs. Pitocin vs. Wait and see.  GBS pos - declines abx.   Intermittent monitoring  Desires minimal interventions and unmedicated birth  Will recheck in 2 hours  Expect vaginal delivery

## 2024-08-08 NOTE — SUBJECTIVE & OBJECTIVE
Interval History:  Adelaida is a 39 y.o.  at 40w6d. She is doing well.  Appears very comfortable feeling an occasional contraction does report them to be 5-6 minutes apart.  However during our conversation she only had 1 small contraction.    Objective:     Vital Signs (Most Recent):  Temp: 97.3 °F (36.3 °C) (24 0659)  Pulse: 89 (24 0753)  Resp: 18 (24 0659)  BP: 131/79 (24 0753)  SpO2: (!) 92 % (24 0753) Vital Signs (24h Range):  Temp:  [97.3 °F (36.3 °C)-98.6 °F (37 °C)] 97.3 °F (36.3 °C)  Pulse:  [] 89  Resp:  [18-20] 18  SpO2:  [92 %-99 %] 92 %  BP: (120-141)/(68-80) 131/79        There is no height or weight on file to calculate BMI.    FHT:  Patient is on intermittent monitoring for labor.  She has agreed to put on a monitor to measure contractions and baby's heart tones more effectively  TOCO:  Per patient's 6 minutes apart    Cervical Exam:  Dilation:  8  Effacement:  100%  Station: -3  Presentation: Vertex     Significant Labs:  Lab Results   Component Value Date    GROUPTRH A NEG 2024    HEPBSAG Negative 2024    STREPBCULT positive 2024       CBC:   Recent Labs   Lab 24  2235   WBC 15.78*   RBC 3.96*   HGB 11.5*   HCT 33.7*      MCV 85   MCH 29.0   MCHC 34.1     I have personallly reviewed all pertinent lab results from the last 24 hours.    Physical Exam    Review of Systems

## 2024-08-08 NOTE — HOSPITAL COURSE
SVE 6.5/70/-2 on arrival, intact. Desires NCB.    8/8/24 - Recognized fatigue and request discussion regarding epidural. Questions answered. Anesthesia notified per RN of patient request.    8/9/24 PPD1 - Doing well. No complaints. Baby being monitored by NICU for tachypnea.    8/10/24 PPD2 - Doing well. No complaints. Baby at bedside. Ready for discharge.

## 2024-08-08 NOTE — ASSESSMENT & PLAN NOTE
"Spontaneous onset of labor. Pt requests min interventions and has had multiple visits and discussions regarding risks and recommended interventions. Declines GBS prophylaxis. Did not receive Rhogam. I am available and present on unit.   SVE /-2 Moderate to strong contractions - remains unchanged  Discussed maternal exhaustion and pitocin.  Will start Pit per protocol  GBS pos - declines abx.   Cont. Monitoring  Will recheck in 2 hours  Expect vaginal delivery    Full conversation and assessment with the patient.  Patient is absolutely refusing intervention.  She has agreed to wear a monitor for some time to adequately measure the contraction frequency as well as fetal heart tones for a little longer.  That is being placed right now.  Fetal heart tones intermittently overnight have been acceptable.  Patient has refused Pitocin despite my discussion that is safe and effective in warranted at this point.  Patient is refusing group B strep prophylaxis as she has only been ruptured for 2 hours.  She will consider it " later" if she remains ruptured and undelivered.  She mentioned something like 12 hours.  I have discussed the risk of group B strep sepsis and ramifications and patient says she understands those and does not take antibiotics herself and will not give them to her children at this point.   Patient and no way shape perform wants a  delivery except for an extreme measures for extreme fetal distress.  She is worried the Pitocin will lead to that.  I have discussed this is a very unusual and protracted labor course.  Patient has minimal pain and not adequate contractions and not making progress.  I again recommended Pitocin and she has declined.  I am documenting as not in complete agreement with the plan of care; patient understands this.  She has agreed to try the peanut ball.  "

## 2024-08-09 LAB
BASOPHILS # BLD AUTO: 0.03 K/UL (ref 0–0.2)
BASOPHILS NFR BLD: 0.2 % (ref 0–1.9)
CREAT SERPL-MCNC: 0.5 MG/DL (ref 0.5–1.4)
DIFFERENTIAL METHOD BLD: ABNORMAL
EOSINOPHIL # BLD AUTO: 0.1 K/UL (ref 0–0.5)
EOSINOPHIL NFR BLD: 0.7 % (ref 0–8)
ERYTHROCYTE [DISTWIDTH] IN BLOOD BY AUTOMATED COUNT: 15.3 % (ref 11.5–14.5)
EST. GFR  (NO RACE VARIABLE): >60 ML/MIN/1.73 M^2
HCT VFR BLD AUTO: 31.2 % (ref 37–48.5)
HGB BLD-MCNC: 10.5 G/DL (ref 12–16)
IMM GRANULOCYTES # BLD AUTO: 0.08 K/UL (ref 0–0.04)
IMM GRANULOCYTES NFR BLD AUTO: 0.5 % (ref 0–0.5)
LYMPHOCYTES # BLD AUTO: 2.3 K/UL (ref 1–4.8)
LYMPHOCYTES NFR BLD: 14.2 % (ref 18–48)
MCH RBC QN AUTO: 28.8 PG (ref 27–31)
MCHC RBC AUTO-ENTMCNC: 33.7 G/DL (ref 32–36)
MCV RBC AUTO: 86 FL (ref 82–98)
MONOCYTES # BLD AUTO: 1.3 K/UL (ref 0.3–1)
MONOCYTES NFR BLD: 7.8 % (ref 4–15)
NEUTROPHILS # BLD AUTO: 12.3 K/UL (ref 1.8–7.7)
NEUTROPHILS NFR BLD: 76.6 % (ref 38–73)
NRBC BLD-RTO: 0 /100 WBC
PLATELET # BLD AUTO: 232 K/UL (ref 150–450)
PMV BLD AUTO: 10.6 FL (ref 9.2–12.9)
RBC # BLD AUTO: 3.65 M/UL (ref 4–5.4)
WBC # BLD AUTO: 16.04 K/UL (ref 3.9–12.7)

## 2024-08-09 PROCEDURE — 82565 ASSAY OF CREATININE: CPT | Performed by: OBSTETRICS & GYNECOLOGY

## 2024-08-09 PROCEDURE — 25000003 PHARM REV CODE 250

## 2024-08-09 PROCEDURE — 12000002 HC ACUTE/MED SURGE SEMI-PRIVATE ROOM

## 2024-08-09 PROCEDURE — 36415 COLL VENOUS BLD VENIPUNCTURE: CPT | Performed by: OBSTETRICS & GYNECOLOGY

## 2024-08-09 PROCEDURE — 85025 COMPLETE CBC W/AUTO DIFF WBC: CPT | Performed by: OBSTETRICS & GYNECOLOGY

## 2024-08-09 RX ORDER — HYDROCODONE BITARTRATE AND ACETAMINOPHEN 10; 325 MG/1; MG/1
1 TABLET ORAL EVERY 4 HOURS PRN
Status: DISCONTINUED | OUTPATIENT
Start: 2024-08-09 | End: 2024-08-10 | Stop reason: HOSPADM

## 2024-08-09 RX ORDER — HYDROCORTISONE 25 MG/G
CREAM TOPICAL 3 TIMES DAILY PRN
Status: DISCONTINUED | OUTPATIENT
Start: 2024-08-09 | End: 2024-08-10 | Stop reason: HOSPADM

## 2024-08-09 RX ORDER — DOCUSATE SODIUM 100 MG/1
200 CAPSULE, LIQUID FILLED ORAL 2 TIMES DAILY PRN
Status: DISCONTINUED | OUTPATIENT
Start: 2024-08-09 | End: 2024-08-10 | Stop reason: HOSPADM

## 2024-08-09 RX ORDER — OXYTOCIN-SODIUM CHLORIDE 0.9% IV SOLN 30 UNIT/500ML 30-0.9/5 UT/ML-%
95 SOLUTION INTRAVENOUS ONCE
Status: DISCONTINUED | OUTPATIENT
Start: 2024-08-09 | End: 2024-08-10 | Stop reason: HOSPADM

## 2024-08-09 RX ORDER — DIPHENHYDRAMINE HYDROCHLORIDE 50 MG/ML
25 INJECTION INTRAMUSCULAR; INTRAVENOUS EVERY 4 HOURS PRN
Status: DISCONTINUED | OUTPATIENT
Start: 2024-08-09 | End: 2024-08-10 | Stop reason: HOSPADM

## 2024-08-09 RX ORDER — PRENATAL WITH FERROUS FUM AND FOLIC ACID 3080; 920; 120; 400; 22; 1.84; 3; 20; 10; 1; 12; 200; 27; 25; 2 [IU]/1; [IU]/1; MG/1; [IU]/1; MG/1; MG/1; MG/1; MG/1; MG/1; MG/1; UG/1; MG/1; MG/1; MG/1; MG/1
1 TABLET ORAL DAILY
Status: DISCONTINUED | OUTPATIENT
Start: 2024-08-09 | End: 2024-08-10 | Stop reason: HOSPADM

## 2024-08-09 RX ORDER — DIPHENHYDRAMINE HCL 25 MG
25 CAPSULE ORAL EVERY 4 HOURS PRN
Status: DISCONTINUED | OUTPATIENT
Start: 2024-08-09 | End: 2024-08-10 | Stop reason: HOSPADM

## 2024-08-09 RX ORDER — ACETAMINOPHEN 325 MG/1
650 TABLET ORAL EVERY 6 HOURS SCHEDULED
Status: DISCONTINUED | OUTPATIENT
Start: 2024-08-09 | End: 2024-08-10

## 2024-08-09 RX ORDER — SIMETHICONE 80 MG
1 TABLET,CHEWABLE ORAL EVERY 6 HOURS PRN
Status: DISCONTINUED | OUTPATIENT
Start: 2024-08-09 | End: 2024-08-10 | Stop reason: HOSPADM

## 2024-08-09 RX ORDER — OXYCODONE AND ACETAMINOPHEN 5; 325 MG/1; MG/1
1 TABLET ORAL EVERY 4 HOURS PRN
Status: DISCONTINUED | OUTPATIENT
Start: 2024-08-09 | End: 2024-08-10 | Stop reason: HOSPADM

## 2024-08-09 RX ADMIN — BENZOCAINE AND LEVOMENTHOL: 200; 5 SPRAY TOPICAL at 05:08

## 2024-08-09 RX ADMIN — DOCUSATE SODIUM 200 MG: 100 CAPSULE, LIQUID FILLED ORAL at 03:08

## 2024-08-09 NOTE — SUBJECTIVE & OBJECTIVE
Interval History: PPD 1    She is doing well this morning. She is tolerating a regular diet without nausea or vomiting. She is voiding spontaneously. She is ambulating. She has passed flatus, and has not a BM. Vaginal bleeding is mild. She denies fever or chills. Abdominal pain is mild and controlled with oral medications. She Is breastfeeding. She desires circumcision for her male baby: no.    Objective:     Vital Signs (Most Recent):  Temp: 98.1 °F (36.7 °C) (08/09/24 1130)  Pulse: 83 (08/09/24 1130)  Resp: 18 (08/09/24 1130)  BP: 137/87 (08/09/24 1130)  SpO2: 95 % (08/09/24 1130) Vital Signs (24h Range):  Temp:  [97.7 °F (36.5 °C)-99.8 °F (37.7 °C)] 98.1 °F (36.7 °C)  Pulse:  [] 83  Resp:  [16-20] 18  SpO2:  [95 %-100 %] 95 %  BP: (107-147)/(55-91) 137/87        Body mass index is 37.57 kg/m².      Intake/Output Summary (Last 24 hours) at 8/9/2024 1526  Last data filed at 8/9/2024 0747  Gross per 24 hour   Intake --   Output 4610 ml   Net -4610 ml         Significant Labs:  Lab Results   Component Value Date    GROUPTRH A NEG 08/07/2024    HEPBSAG Negative 01/17/2024    STREPBCULT positive 07/03/2024     Recent Labs   Lab 08/09/24  0617   HGB 10.5*   HCT 31.2*       I have personallly reviewed all pertinent lab results from the last 24 hours.  Recent Lab Results         08/09/24  0617        Baso # 0.03       Basophil % 0.2       Creatinine 0.5       Differential Method Automated       eGFR >60.0       Eos # 0.1       Eos % 0.7       Gran # (ANC) 12.3       Gran % 76.6       Hematocrit 31.2       Hemoglobin 10.5       Immature Grans (Abs) 0.08  Comment: Mild elevation in immature granulocytes is non specific and   can be seen in a variety of conditions including stress response,   acute inflammation, trauma and pregnancy. Correlation with other   laboratory and clinical findings is essential.         Immature Granulocytes 0.5       Lymph # 2.3       Lymph % 14.2       MCH 28.8       MCHC 33.7       MCV 86        Mono # 1.3       Mono % 7.8       MPV 10.6       nRBC 0       Platelet Count 232       RBC 3.65       RDW 15.3       WBC 16.04               Physical Exam:   Constitutional: She is oriented to person, place, and time. She appears well-developed and well-nourished.       Cardiovascular:  Normal rate, regular rhythm, normal heart sounds and intact distal pulses.           Pulse Score: 1+   Pulmonary/Chest: Effort normal and breath sounds normal.        Abdominal: Soft. Bowel sounds are normal.     Genitourinary:    Vagina and uterus normal.   Vaginal discharge: Normal lochia.           Musculoskeletal: Normal range of motion and moves all extremeties. Edema: +3 BLE.       Neurological: She is alert and oriented to person, place, and time. She has normal reflexes.    Skin: Skin is warm and dry.    Psychiatric: She has a normal mood and affect. Her behavior is normal. Judgment and thought content normal.       Review of Systems

## 2024-08-09 NOTE — L&D DELIVERY NOTE
"[facility]  Vaginal Delivery   Operative Note    SUMMARY     Normal spontaneous vaginal delivery of live infant, was placed on mothers abdomen for skin to skin and bulb suctioning performed.  Infant delivered position OA over intact perineum.  Nuchal cord: Yes, cord reduced following delivery.    Spontaneous delivery of placenta and IV pitocin given noting good uterine tone.  2nd degree laceration noted and repaired in normal fashion with 2-0 Chromic on CT .  Patient tolerated delivery well. Sponge needle and lap counted correctly x2.    Indications:  (spontaneous vaginal delivery)  Pregnancy complicated by:   Patient Active Problem List   Diagnosis    Rh negative, delivered, current hospitalization    GBS carrier     (spontaneous vaginal delivery)    Elevated blood pressure reading in office without diagnosis of hypertension    Breast feeding status of mother    Rubella non-immune status, antepartum    Obesity    Liveborn infant by vaginal delivery    Second degree perineal laceration during delivery     Admitting GA: 40w6d    Delivery Information for Yonatan Agarwal    Birth information:  YOB: [birth year]24   Time of birth: 9:10 PM   Sex: male   Head Delivery Date/Time: 2024 10:11 PM   Delivery type: Vaginal, Spontaneous   Gestational Age: 40w6d        Delivery Providers    Delivering clinician: Itzel Marcelo CNM   Provider Role    Loida Mendez, RN               Measurements    Weight: 4366 g  Weight (lbs): 9 lb 10 oz  Length: 55.9 cm  Length (in): 22"  Chest circumference: 38.1 cm         Apgars    Living status:   Apgar Component Scores:  1 min.:  5 min.:  10 min.:  15 min.:  20 min.:    Skin color:         Heart rate:         Reflex irritability:         Muscle tone:         Respiratory effort:         Total:                  Operative Delivery    Forceps attempted?: No  Vacuum extractor attempted?: No         Shoulder Dystocia    Shoulder dystocia present?: No   "         Presentation    Presentation: Vertex  Position: Middle Occiput Anterior           Interventions/Resuscitation    Method: Bulb Suctioning, CPAP, Tactile Stimulation       Cord    Vessels: 3 vessels  Complications: Nuchal  Nuchal Intervention: reduced  Nuchal Cord Description: loose nuchal cord  Number of Loops: 1  Delayed Cord Clamping?: Yes  Cord Clamped Date/Time: 2024  9:16 PM  Cord Blood Disposition: Sent with Baby  Gases Sent?: No  Stem Cell Collection (by MD): No       Placenta    Placenta delivery date/time: 2024  Placenta removal: Spontaneous  Placenta appearance: Intact  Placenta disposition: Discarded, Family           Labor Events:       labor: No     Labor Onset Date/Time: 2024 13:00     Dilation Complete Date/Time: 2024 18:06     Start Pushing Date/Time: 2024 20:56       Start Pushing Date/Time: 2024 20:56     Rupture Date/Time: 24  1810         Rupture type: SRM (Spontaneous Rupture)         Fluid Amount:       Fluid Color: Clear               steroids: None     Antibiotics given for GBS: Yes     Induction:       Indications for induction:        Augmentation: amniotomy;oxytocin     Indications for augmentation: Ineffective Contraction Pattern     Labor complications: Dysfunctional Labor     Additional complications:          Cervical ripening:                     Delivery:      Episiotomy: None     Indication for Episiotomy:       Perineal Lacerations: 2nd Repaired:  Yes   Periurethral Laceration:   Repaired:     Labial Laceration:   Repaired:     Sulcus Laceration:   Repaired:     Vaginal Laceration:   Repaired:     Cervical Laceration:   Repaired:     Repair suture:       Repair # of packets: 1     Last Value - EBL - Nursing (mL):       Sum - EBL - Nursing (mL): 0     Last Value - EBL - Anesthesia (mL):      Calculated QBL (mL): 110      Running total QBL (mL): 110      Vaginal Sweep Performed: Yes     Surgicount Correct: Yes        Other providers:       Anesthesia    Method: Epidural          Details (if applicable):  Trial of Labor      Categorization:      Priority:     Indications for :     Incision Type:       Additional  information:  Forceps:    Vacuum:    Breech:    Observed anomalies    Other (Comments):

## 2024-08-09 NOTE — PLAN OF CARE
Problem: Infection  Goal: Absence of Infection Signs and Symptoms  8/9/2024 1316 by Rosalba Cunningham RN  Outcome: Progressing  8/9/2024 1311 by Rosalba Cunningham RN  Outcome: Progressing     Problem: Skin Injury Risk Increased  Goal: Skin Health and Integrity  8/9/2024 1316 by Rosalba Cunningham RN  Outcome: Progressing  8/9/2024 1311 by Rosalba Cunningham RN  Outcome: Progressing

## 2024-08-09 NOTE — ASSESSMENT & PLAN NOTE
Baby Boy - Corey  9lb 10oz, 22 inches long  No circ  Care by neonatology for tachypnea and facial peeling  Primary Peds - Dr. YARA Kahn at [facility]

## 2024-08-09 NOTE — PLAN OF CARE
Problem: Adult Inpatient Plan of Care  Goal: Plan of Care Review  Outcome: Progressing  Goal: Patient-Specific Goal (Individualized)  Outcome: Progressing  Goal: Absence of Hospital-Acquired Illness or Injury  Outcome: Progressing  Goal: Optimal Comfort and Wellbeing  Outcome: Progressing  Goal: Readiness for Transition of Care  Outcome: Progressing     Problem:  Fall Injury Risk  Goal: Absence of Fall, Infant Drop and Related Injury  Outcome: Progressing     Problem: Infection  Goal: Absence of Infection Signs and Symptoms  Outcome: Progressing     Problem: Labor  Goal: Hemostasis  Outcome: Progressing  Goal: Stable Fetal Wellbeing  Outcome: Progressing  Goal: Effective Progression to Delivery  Outcome: Progressing  Goal: Absence of Infection Signs and Symptoms  Outcome: Progressing  Goal: Acceptable Pain Control  Outcome: Progressing  Goal: Normal Uterine Contraction Pattern  Outcome: Progressing     Problem: Skin Injury Risk Increased  Goal: Skin Health and Integrity  Outcome: Progressing

## 2024-08-09 NOTE — PROGRESS NOTES
[facility]  Obstetrics  Postpartum Progress Note    Patient Name: Adelaida Agarwal  MRN: [id number]  Admission Date: 2024  Hospital Length of Stay: 2 days  Attending Physician: Loyda Cunningham MD  Primary Care Provider: Aron Barrera Jr., MD    Subjective:     Principal Problem: (spontaneous vaginal delivery)    Hospital Course:  SVE 6.5/70/-2 on arrival, intact. Desires NCB.    24 - Recognized fatigue and request discussion regarding epidural. Questions answered. Anesthesia notified per RN of patient request.    08 PPD1 - Doing well. No complaints. Baby being monitored by NICU for tachypnea.    Interval History: PPD 1    She is doing well this morning. She is tolerating a regular diet without nausea or vomiting. She is voiding spontaneously. She is ambulating. She has passed flatus, and has not a BM. Vaginal bleeding is mild. She denies fever or chills. Abdominal pain is mild and controlled with oral medications. She Is breastfeeding. She desires circumcision for her male baby: no.    Objective:     Vital Signs (Most Recent):  Temp: 98.1 °F (36.7 °C) (24 1130)  Pulse: 83 (24 1130)  Resp: 18 (24 1130)  BP: 137/87 (24 1130)  SpO2: 95 % (24 1130) Vital Signs (24h Range):  Temp:  [97.7 °F (36.5 °C)-99.8 °F (37.7 °C)] 98.1 °F (36.7 °C)  Pulse:  [] 83  Resp:  [16-20] 18  SpO2:  [95 %-100 %] 95 %  BP: (107-147)/(55-91) 137/87        Body mass index is 37.57 kg/m².      Intake/Output Summary (Last 24 hours) at 2024 1526  Last data filed at 2024 0747  Gross per 24 hour   Intake --   Output 4610 ml   Net -4610 ml         Significant Labs:  Lab Results   Component Value Date    GROUPTRH A NEG 2024    HEPBSAG Negative 2024    STREPBCULT positive 2024     Recent Labs   Lab 24  0617   HGB 10.5*   HCT 31.2*       I have personallly reviewed all pertinent lab results from the last 24 hours.  Recent Lab Results          24  0617        Baso # 0.03       Basophil % 0.2       Creatinine 0.5       Differential Method Automated       eGFR >60.0       Eos # 0.1       Eos % 0.7       Gran # (ANC) 12.3       Gran % 76.6       Hematocrit 31.2       Hemoglobin 10.5       Immature Grans (Abs) 0.08  Comment: Mild elevation in immature granulocytes is non specific and   can be seen in a variety of conditions including stress response,   acute inflammation, trauma and pregnancy. Correlation with other   laboratory and clinical findings is essential.         Immature Granulocytes 0.5       Lymph # 2.3       Lymph % 14.2       MCH 28.8       MCHC 33.7       MCV 86       Mono # 1.3       Mono % 7.8       MPV 10.6       nRBC 0       Platelet Count 232       RBC 3.65       RDW 15.3       WBC 16.04               Physical Exam:   Constitutional: She is oriented to person, place, and time. She appears well-developed and well-nourished.       Cardiovascular:  Normal rate, regular rhythm, normal heart sounds and intact distal pulses.           Pulse Score: 1+   Pulmonary/Chest: Effort normal and breath sounds normal.        Abdominal: Soft. Bowel sounds are normal.     Genitourinary:    Vagina and uterus normal.   Vaginal discharge: Normal lochia.           Musculoskeletal: Normal range of motion and moves all extremeties. Edema: +3 BLE.       Neurological: She is alert and oriented to person, place, and time. She has normal reflexes.    Skin: Skin is warm and dry.    Psychiatric: She has a normal mood and affect. Her behavior is normal. Judgment and thought content normal.       Review of Systems  Assessment/Plan:     39 y.o. female  for:    *  (spontaneous vaginal delivery)  Routine PP care    Second degree perineal laceration during delivery  Routine michelle care    Liveborn infant by vaginal delivery  Baby Boy - Corey  9lb 10oz, 22 inches long  No circ  Care by neonatology for tachypnea and facial peeling  Primary Peds - Dr. YARA Kahn  at [facility]      Obesity  Ambulating frequently    Rubella non-immune status, antepartum  Discussed MMR   Declines MMR Post partum    Breast feeding status of mother  Pumping  Lactation consult    GBS carrier  Received partial dose of Vanc just prior to delivery        Rh negative, delivered, current hospitalization  Risk vs benefits of RhoGam discussed at length.  Declines RhoGam  Baby O Neg - No RhoGam needed        Disposition: As patient meets milestones, will plan to discharge tomorrow.    Itzel Marcelo CNM  Obstetrics  Formerly [facility]

## 2024-08-09 NOTE — PLAN OF CARE
[facility]  Discharge Assessment    Primary Care Provider: Aron Barrera Jr., MD     OB Screen completed and no needs identified at this time.  White board in room updated with contact information, and mother was encouraged to contact office if further needs arise.    OB Screen (most recent)       OB Screen - 24 0906          OB SCREEN    Assessment Type Discharge Planning Assessment     Source of Information health record     Received Prenatal Care Yes     Any indications/suspicions for None     Is this a teen pregnancy No     Is the baby in NICU No     Indication for adoption/Safe Haven No     Indication for DME/post-acute needs No     HIV (+) No     Any congenital  disorders No     Fetal demise/ death No

## 2024-08-09 NOTE — NURSING
Formerly [facility], later [facility]  Department of Obstetrics and Gynecology  PATIENT NAME: Adelaida Agarwal  MRN: [id number]  TODAY'S DATE: 2024    CHIEF COMPLAINT: Contractions      OB History    Para Term  AB Living   4 3 3 0 1 3   SAB IAB Ectopic Multiple Live Births   0 1 0 0 3      # Outcome Date GA Lbr Darinel/2nd Weight Sex Type Anes PTL Lv   4 Term 24 40w6d 29:06 / 03:04 4.366 kg (9 lb 10 oz) M Vag-Spont EPI N DIANDRA      Complications: Dysfunctional Labor      Name: Yonatan Agarwal      Apgar1: 7  Apgar5: 9   3 Term 21 40w1d  3.62 kg (7 lb 15.7 oz) F Vag-Spont None N DIANDRA      Name: Marybel      Apgar1: 9  Apgar5: 9   2 Term 20 41w0d  3.402 kg (7 lb 8 oz) M  None N DIANDRA      Birth Comments: no comlpications      Name: Antony Rain   1 IAB              Past Medical History:   Diagnosis Date    Tobacco use     Past, quit      Past Surgical History:   Procedure Laterality Date    skin cyst      removal, not cancer    TONSILLECTOMY      as a child    WISDOM TOOTH EXTRACTION      No complications     Social History     Tobacco Use    Smoking status: Former     Current packs/day: 0.00     Types: Cigarettes     Quit date: 2019     Years since quittin.2    Smokeless tobacco: Never    Tobacco comments:     on chantix, not any longer   Substance Use Topics    Alcohol use: Not Currently     Comment: socially, not when pregnant    Drug use: Never       Prenatal Labs  Lab Results   Component Value Date    GROUPTRH A NEG 2024    HGB 11.5 (L) 2024    HCT 33.7 (L) 2024     2024    RUBELLAIMMUN non immune 2024    HEPBSAG Negative 2024    [id number] non reactive 2024    RPR non reactive 2024    OBGLUCOSESCR 123 2024    STREPBCULT positive 2024       VITAL SIGNS - ABNORMAL VITALS INCLUDE TEMP >100.4,RR <12 or >26, SUSTAINED MATERNAL PULSE <60 or >120     VITAL SIGNS (Most Recent)  Temp: 98.1 °F (36.7 °C)  (08/08/24 1901)  Pulse: 90 (08/08/24 1822)  Resp: 18 (08/08/24 1600)  BP: 133/78 (08/08/24 1822)  SpO2: 97 % (08/08/24 1306)    OUTPATIENT MEDICATIONS  Current Outpatient Medications   Medication Instructions    docusate sodium (COLACE) 200 mg, Oral, 2 times daily PRN       Loida Mendez RN  [facility]  08/08/2024

## 2024-08-09 NOTE — LACTATION NOTE
08/09/24 1330   Maternal Infant Feeding   Maternal Emotional State assist needed   Infant Positioning cradle   Signs of Milk Transfer audible swallow;infant jaw motion present   Pain with Feeding no   Comfort Measures Before/During Feeding infant position adjusted;latch adjusted;maternal position adjusted   Latch Assistance yes     Assisted to breastfeed at bedside in NICU. Assisted to latch baby to left breast in cradle position. Baby latched deeply, nursing well with audible swallows. Mother denies pain during feeding. Reviewed basic breastfeeding instructions and encouraged to call me for any further breastfeeding assistance. Patient verbalizes understanding of all instructions with good recall.    Instructed on proper latch to facilitate effective breastfeeding.  Discussed recognizing hunger cues, appropriate positioning and wide mouth latch.  Discussed ways to determine an effective latch including:  areola included in latch, rhythmic/nutritive sucking and audible swallowing.  Also discussed soreness/tenderness associated with latch and prevention and treatment.  Pt states understanding and verbalized appropriate recall.

## 2024-08-09 NOTE — LACTATION NOTE
08/09/24 1240   Maternal Assessment   Breast Density Bilateral:;soft   Areola Bilateral:;elastic   Nipples Bilateral:;everted   Equipment Type   Breast Pump Type double electric, hospital grade   Breast Pump Flange Type hard   Breast Pump Flange Size 21 mm   Breast Pumping   Breast Pumping Interventions frequent pumping encouraged   Breast Pumping hand expression utilized;double electric breast pump utilized     Baby transferred to NICU for respiratory distress. Initiated pumping at this time. Reviewed NICU pumping instructions and assisted with pumping session. Encouraged to pump at least 8 times in 24 hours to stimulate adequate milk production. Encouraged to call me for any further assistance with breast pump. Patient verbalizes understanding of all instructions with good recall.    Bazaarthony pump, tubing, collections containers and labels brought to bedside.  Discussed proper pump setting of initiation phase.  Instructed on proper usage of pump and to adjust suction according to maximum comfort level.  Verified appropriate flange fit.  Educated on the frequency and duration of pumping in order to promote and maintain a full milk supply.  Hands on pumping technique reviewed.  Encouraged hand expression after pumping.  Instructed on cleaning of breast pump parts.  Written instructions also given.  Pt verbalized understanding and appropriate recall for proper milk handling, collection, labeling, storage and transportation.

## 2024-08-09 NOTE — NURSING
[facility]  Department of OBGYN  OB Summary        PATIENT NAME: Adelaida Agarwal                                                                                                                                                                       AGE: 39 y.o.                                                                                          MRN: [id number]  PATIENT LOCATION:  [facility]9/[id number]  ADMIT DATE: 2024  TODAY'S DATE: 2024 Hospital Day: 2  TIRSO: Estimated Date of Delivery: 24  GA: 40w6d     OB HISTORY:    OB History    Para Term  AB Living   4 3 3   1 3   SAB IAB Ectopic Multiple Live Births     1   0 3      # Outcome Date GA Lbr Darinel/2nd Weight Sex Type Anes PTL Lv   4 Term 24 40w6d 29:06 / 03:04 4.366 kg (9 lb 10 oz) M Vag-Spont EPI N DIANDRA      Complications: Dysfunctional Labor   3 Term 21 40w1d  3.62 kg (7 lb 15.7 oz) F Vag-Spont None N DIANDRA   2 Term 20 41w0d  3.402 kg (7 lb 8 oz) M  None N DIANDRA      Birth Comments: no comlpications   1 IAB                PRE-PROCEDURE DIAGNOSIS:  (spontaneous vaginal delivery)    PROCEDURE:   * No surgery found *       MATERNAL LABS   Lab Results   Component Value Date    GROUPTRH A NEG 2024    HGB 11.5 (L) 2024    HCT 33.7 (L) 2024     2024    RUBELLAIMMUN non immune 2024    HEPBSAG Negative 2024    IZQ28KPCX non reactive 2024    RPR non reactive 2024    OBGLUCOSESCR 123 2024    STREPBCULT positive 2024       Fentanyl, Urine   Date Value Ref Range Status   2024 Negative @ANAYELI Negative Final     Comment:     The cut-off value is 5.0 ng/mL. This is a screening test. If results   do not   correlate with clinical presentation then a confirmatory send out   test is   advised. This result is intended for use in clinical management. It   is not   intended for use in employment related testing.         Benzodiazepines   Date Value  "Ref Range Status   08/07/2024 Negative Negative Final     Cocaine (Metab.)   Date Value Ref Range Status   08/07/2024 Negative Negative Final     Opiate Scrn, Ur   Date Value Ref Range Status   08/07/2024 Negative Negative Final     Barbiturate Screen, Ur   Date Value Ref Range Status   08/07/2024 Negative Negative Final     Amphetamine Screen, Ur   Date Value Ref Range Status   08/07/2024 Negative Negative Final     THC   Date Value Ref Range Status   08/07/2024 Negative Negative Final     Phencyclidine   Date Value Ref Range Status   08/07/2024 Negative Negative Final     BUPRENORPHINE   Date Value Ref Range Status   08/07/2024 Negative  Final     Comment:     Buprenorphine urine screening threshold: 5 ng/mL.    This report is intended for use in clinical monitoring and management   of   patients only.          SPECIMENS  Specimen (24h ago, onward)      None             Antibiotics (From admission, onward)      Start     Stop Route Frequency Ordered    08/08/24 1921  vancomycin - pharmacy to dose  (vancomycin IVPB (PEDS and ADULTS))        Placed in "And" Linked Group    -- IV pharmacy to manage frequency 08/08/24 1821            INPATIENT MEDICATIONS  Current Facility-Administered Medications   Medication Dose Route Frequency Provider Last Rate Last Admin    0.9%  NaCl infusion   Intra-Catheter PRN Itzel Marcelo CNM        butorphanol injection 1 mg  1 mg Intravenous Q3H PRN Elaina Camacho MD        calcium carbonate 200 mg calcium (500 mg) chewable tablet 500 mg  500 mg Oral TID PRN Itzel Marcelo CNM        carboprost injection 250 mcg  250 mcg Intramuscular Q15 Min PRN Itzel Marcelo CNM        carboprost injection 250 mcg  250 mcg Intramuscular Q15 Min PRN Itzel Marcelo CNM        diphenhydrAMINE injection 12.5 mg  12.5 mg Intravenous Q4H PRN Masood Castillo MD        diphenoxylate-atropine 2.5-0.025 mg per tablet 2 tablet  2 tablet Oral Q6H PRN Itzel Marcelo CNM        " diphenoxylate-atropine 2.5-0.025 mg per tablet 2 tablet  2 tablet Oral Q6H PRN Itzel Marcelo, MARY        ePHEDrine sulfate 10 mg  10 mg Intravenous Once PRN Masood Castillo MD        fentanyl 2 mcg/mL with BUPivacaine 0.1% in sodium chloride 0.9% Epidural   Epidural Continuous Loyda Cunningham MD        fentanyl 2 mcg/mL with BUPivacaine 0.1% in sodium chloride 0.9% Epidural  14 mL/hr Epidural Continuous Masood Castillo MD        lactated ringers bolus 1,000 mL  1,000 mL Intravenous PRN Itzel Marcelo CNM        lactated ringers bolus 1,000 mL  1,000 mL Intravenous Once Masood Castillo MD        lactated ringers bolus 500 mL  500 mL Intravenous PRN Itzel Marcelo CNM        lactated ringers infusion   Intravenous Continuous Itzel Marcelo  mL/hr at 08/08/24 0912 New Bag at 08/08/24 0912    LIDOcaine HCL 10 mg/ml (1%) injection 10 mL  10 mL Intradermal Once PRN Itzel Marcelo CNM        methylergonovine injection 200 mcg  200 mcg Intramuscular Once PRN Itzel Marcelo, MARY        methylergonovine injection 200 mcg  200 mcg Intramuscular Once PRN Itzel Marcelo, MARY        miSOPROStoL tablet 800 mcg  800 mcg Rectal Once PRN Itzel Marcelo, MARY        miSOPROStoL tablet 800 mcg  800 mcg Oral Once PRN Itzel Marcelo, MARY        miSOPROStoL tablet 800 mcg  800 mcg Rectal Once PRN Itzel Marcelo, MARY        miSOPROStoL tablet 800 mcg  800 mcg Oral Once PRN Itzel Marcelo, MARY        naloxone 0.4 mg/mL injection 0.4 mg  0.4 mg Intravenous See admin instructions Masood Castillo MD        ondansetron disintegrating tablet 8 mg  8 mg Oral Q8H PRN Itzel Marcelo CNM        ondansetron disintegrating tablet 8 mg  8 mg Oral Q8H PRN Itzel Marcelo, MARY        ondansetron injection 4 mg  4 mg Intravenous Once Masood Castillo MD        oxytocin 30 units in 500 mL normal saline infusion (loading dose)  10.0002 Units Intravenous Once Itzel Marcelo  MARY DENTON        oxytocin 30 units in 500 mL normal saline infusion (loading dose)  10.0002 Units Intravenous Once PRN Itzel Marcelo CNM        oxytocin 30 units in 500 mL normal saline infusion (loading dose)  10.0002 Units Intravenous Once PRN Itzel Marcelo CNM        oxytocin 30 units/500 mL (60 milliunits/mL) in 0.9% NaCl (TITRATING)  95 babak-units/min Intravenous Once PRN Itzel Marcelo CNM 4 mL/hr at 24 4 babak-units/min at 24    oxytocin 30 units/500 mL (60 milliunits/mL) in 0.9% NaCl (TITRATING)  0-32 babak-units/min Intravenous Continuous Elaina Camacho MD 0 mL/hr at 24 0 babak-units/min at 24    oxytocin 30 units/500 mL (60 milliunits/mL) in 0.9% NaCl (TITRATING)  95 babak-units/min Intravenous Once PRN Itzel Marcelo CNM        oxytocin injection 10 Units  10 Units Intramuscular Once PRN Itzel Marcelo CNM        oxytocin injection 10 Units  10 Units Intramuscular Once PRN Itzel Marcelo CNM        simethicone chewable tablet 80 mg  1 tablet Oral QID PRN Itzel Marcelo CNM        sodium chloride 0.9% flush 10 mL  10 mL Intravenous PRN Itzel Marcelo CNM        tranexamic acid in NaCl,iso-os IVPB 1,000 mg  1,000 mg Intravenous Q30 Min PRN Itzel Marcelo CNM        tranexamic acid in NaCl,iso-os IVPB 1,000 mg  1,000 mg Intravenous Q30 Min PRN Itzel Marcelo CNM        vancomycin - pharmacy to dose   Intravenous pharmacy to manage frequency Itzel Marcelo CNM           OUTPATIENT MEDICATIONS  Current Outpatient Medications   Medication Instructions    docusate sodium (COLACE) 200 mg, Oral, 2 times daily PRN       VITAL SIGNS (Most Recent)  Temp: 98.1 °F (36.7 °C) (24 190)  Pulse: 90 (24 1822)  Resp: 18 (24 1600)  BP: 133/78 (24 1822)  SpO2: 97 % (24 1306)  Temp (36hrs), Av.8 °F (36.6 °C), Min:97.3 °F (36.3 °C), Max:98.6 °F (37 °C)      Delivery Information for Boy  "Adelaida Agarwal    Birth information:  YOB: 2023   Time of birth: 9:10 PM   Sex: male   Head Delivery Date/Time: 2024  9:10 PM   Delivery type: Vaginal, Spontaneous   Gestational Age: 40w6d        Delivery Providers    Delivering clinician: Itzel Marcelo CNM   Provider Role    Loida Mendez RN Registered Nurse    Beatris Bradley RN Registered Nurse              Measurements    Weight: 4366 g  Weight (lbs): 9 lb 10 oz  Length: 55.9 cm  Length (in): 22"  Chest circumference: 38.1 cm         Apgars    Living status: Living  Apgar Component Scores:  1 min.:  5 min.:  10 min.:  15 min.:  20 min.:    Skin color:  0  1       Heart rate:  2  2       Reflex irritability:  2  2       Muscle tone:  1  2       Respiratory effort:  2  2       Total:  7  9       Apgars assigned by: BREANN BRADLEY RN         Operative Delivery    Forceps attempted?: No  Vacuum extractor attempted?: No         Shoulder Dystocia    Shoulder dystocia present?: No           Presentation    Presentation: Vertex  Position: Middle Occiput Anterior           Interventions/Resuscitation    Method: Bulb Suctioning, CPAP, Tactile Stimulation       Cord    Vessels: 3 vessels  Complications: Nuchal  Nuchal Intervention: reduced  Nuchal Cord Description: loose nuchal cord  Number of Loops: 1  Delayed Cord Clamping?: Yes  Cord Clamped Date/Time: 2024  9:16 PM  Cord Blood Disposition: Sent with Baby  Gases Sent?: No  Stem Cell Collection (by MD): No       Placenta    Placenta delivery date/time: 20244  Placenta removal: Spontaneous  Placenta appearance: Intact  Placenta disposition: Discarded, Family           Labor Events:       labor: No     Labor Onset Date/Time: 2024 13:00     Dilation Complete Date/Time: 2024 18:06     Start Pushing Date/Time: 2024 20:56     Rupture Date/Time: 24         Rupture type: SRM (Spontaneous Rupture)         Fluid Amount:       Fluid Color: Clear    "    steroids: None     Antibiotics given for GBS: Yes     Induction:       Indications for induction:        Augmentation: amniotomy;oxytocin     Indications for augmentation: Ineffective Contraction Pattern     Labor complications: Dysfunctional Labor     Additional complications:          Cervical ripening:                     Delivery:      Episiotomy: None     Indication for Episiotomy:       Perineal Lacerations: 2nd Repaired:  Yes   Periurethral Laceration:   Repaired:     Labial Laceration:   Repaired:     Sulcus Laceration:   Repaired:     Vaginal Laceration:   Repaired:     Cervical Laceration:   Repaired:     Repair suture:       Repair # of packets: 1     Last Value - EBL - Nursing (mL):       Sum - EBL - Nursing (mL): 0     Last Value - EBL - Anesthesia (mL):      Calculated QBL (mL): 110      Running total QBL (mL): 110      Vaginal Sweep Performed: Yes     Surgicount Correct: Yes       Other providers:       Anesthesia    Method: Epidural          Details (if applicable):  Trial of Labor      Categorization:      Priority:     Indications for :     Incision Type:       Additional  information:  Forceps:    Vacuum:    Breech:    Observed anomalies    Other (Comments):           Time ruptured: 15h 05m    SKIN TO SKIN                INFANT FEEDING       PAST MEDICAL HISTORY   Past Medical History:   Diagnosis Date    Tobacco use     Past, quit 2019        PAST SURGICAL HISTORY    Past Surgical History:   Procedure Laterality Date    skin cyst  2010    removal, not cancer    TONSILLECTOMY      as a child    WISDOM TOOTH EXTRACTION      No complications        SOCIAL HISTORY    Social History     Tobacco Use    Smoking status: Former     Current packs/day: 0.00     Types: Cigarettes     Quit date: 2019     Years since quittin.2    Smokeless tobacco: Never    Tobacco comments:     on chantix, not any longer   Substance Use Topics    Alcohol use: Not  Currently     Comment: socially, not when pregnant    Drug use: Never                     Loida Mendez RN  Date of Service: 08/08/2024  10:44 PM

## 2024-08-09 NOTE — NURSING
2340 Up to the bathroom, voiding freely.  Instructed on pericare with good return demonstration.  Able to be up adlib.      0005 Pt up to shower per self.      0026 Report called to Niurka TOBIN.

## 2024-08-10 ENCOUNTER — PATIENT MESSAGE (OUTPATIENT)
Dept: LACTATION | Facility: HOSPITAL | Age: 39
End: 2024-08-10

## 2024-08-10 VITALS
SYSTOLIC BLOOD PRESSURE: 122 MMHG | WEIGHT: 239.88 LBS | HEART RATE: 70 BPM | TEMPERATURE: 98 F | HEIGHT: 67 IN | DIASTOLIC BLOOD PRESSURE: 82 MMHG | OXYGEN SATURATION: 97 % | BODY MASS INDEX: 37.65 KG/M2 | RESPIRATION RATE: 18 BRPM

## 2024-08-10 PROCEDURE — 25000003 PHARM REV CODE 250

## 2024-08-10 RX ORDER — ACETAMINOPHEN 325 MG/1
650 TABLET ORAL EVERY 6 HOURS PRN
Status: DISCONTINUED | OUTPATIENT
Start: 2024-08-10 | End: 2024-08-10 | Stop reason: HOSPADM

## 2024-08-10 RX ORDER — IBUPROFEN 600 MG/1
600 TABLET ORAL EVERY 6 HOURS PRN
Qty: 30 TABLET | Refills: 1 | Status: SHIPPED | OUTPATIENT
Start: 2024-08-10

## 2024-08-10 RX ORDER — DOCUSATE SODIUM 100 MG/1
200 CAPSULE, LIQUID FILLED ORAL 2 TIMES DAILY PRN
Start: 2024-08-10

## 2024-08-10 RX ORDER — IBUPROFEN 600 MG/1
800 TABLET ORAL EVERY 8 HOURS PRN
Qty: 30 TABLET | Refills: 0 | Status: SHIPPED | OUTPATIENT
Start: 2024-08-10

## 2024-08-10 RX ORDER — PRENATAL WITH FERROUS FUM AND FOLIC ACID 3080; 920; 120; 400; 22; 1.84; 3; 20; 10; 1; 12; 200; 27; 25; 2 [IU]/1; [IU]/1; MG/1; [IU]/1; MG/1; MG/1; MG/1; MG/1; MG/1; MG/1; UG/1; MG/1; MG/1; MG/1; MG/1
1 TABLET ORAL DAILY
Qty: 30 TABLET | Refills: 11 | Status: SHIPPED | OUTPATIENT
Start: 2024-08-10 | End: 2025-08-10

## 2024-08-10 RX ORDER — IBUPROFEN 600 MG/1
600 TABLET ORAL EVERY 6 HOURS PRN
Qty: 30 TABLET | Refills: 0 | Status: SHIPPED | OUTPATIENT
Start: 2024-08-10

## 2024-08-10 RX ADMIN — DOCUSATE SODIUM 200 MG: 100 CAPSULE, LIQUID FILLED ORAL at 09:08

## 2024-08-10 NOTE — DISCHARGE INSTRUCTIONS

## 2024-08-10 NOTE — DISCHARGE SUMMARY
[facility]  Obstetrics  Discharge Summary      Patient Name: Adelaida Agarwal  MRN: [id number]  Admission Date: 2024  Hospital Length of Stay: 3 days  Discharge Date and Time:  08/10/2024 10:00 AM  Attending Physician: Loyda Cunningham MD   Discharging Provider: Itzel Marcelo CNM   Primary Care Provider: Aron Barrera Jr., MD    HPI: 39 year old  at 40.5 presents in spontaneous labor. Contractions began last night. Has been laboring at home until contractions felt more like active labor. GBS + - declines abx prophylaxis.           * No surgery found *     Hospital Course:   SVE 6.5/70/-2 on arrival, intact. Desires NCB.    24 - Recognized fatigue and request discussion regarding epidural. Questions answered. Anesthesia notified per RN of patient request.    24 PPD1 - Doing well. No complaints. Baby being monitored by NICU for tachypnea.    8/10/24 PPD2 - Doing well. No complaints. Baby at bedside. Ready for discharge.      Consults (From admission, onward)          Status Ordering Provider     Inpatient consult to Lactation  Once        Provider:  (Not yet assigned)    Acknowledged LOYDA CUNNINGHAM            Final Active Diagnoses:    Diagnosis Date Noted POA    PRINCIPAL PROBLEM:   (spontaneous vaginal delivery) [O80] 2021 Not Applicable    Liveborn infant by vaginal delivery [Z38.00] 2024 No    Second degree perineal laceration during delivery [O70.1] 2024 No    Rubella non-immune status, antepartum [O09.899, Z28.39] 2024 Not Applicable    Obesity [E66.9] 2024 Yes    Breast feeding status of mother [Z39.1] 2021 Not Applicable    GBS carrier [Z22.330] 2021 Not Applicable    Rh negative, delivered, current hospitalization [O26.899, Z67.91] 2019 Not Applicable      Problems Resolved During this Admission:    Diagnosis Date Noted Date Resolved POA    Encounter for supervision of normal intrauterine pregnancy in  "multigravida, antepartum [Z34.80] 2019 Not Applicable        Significant Diagnostic Studies: Labs: All labs within the past 24 hours have been reviewed  Lab Results   Component Value Date    GROUPTRISMAEL VILLALBA NEG 2024         Feeding Method: breast    Immunizations       None            Delivery:    Episiotomy: None   Lacerations: 2nd   Repair suture:     Repair # of packets: 1   Blood loss (ml):       Birth information:  YOB: 2023   Time of birth: 9:10 PM   Sex: male   Delivery type: Vaginal, Spontaneous   Gestational Age: 40w6d     Measurements    Weight: 4370 g  Weight (lbs): 9 lb 10.2 oz  Length: 55.9 cm  Length (in): 22"  Chest circumference: 38.1 cm         Delivery Clinician: Delivery Providers    Delivering clinician: Itzel Marcelo CNM   Provider Role    Loida Mendez RN Registered Nurse    Beatris Bradley RN Registered Nurse             Additional  information:  Forceps:    Vacuum:    Breech:    Observed anomalies      Living?:     Apgars    Living status: Living  Apgar Component Scores:  1 min.:  5 min.:  10 min.:  15 min.:  20 min.:    Skin color:  0  1       Heart rate:  2  2       Reflex irritability:  2  2       Muscle tone:  1  2       Respiratory effort:  2  2       Total:  7  9       Apgars assigned by: BREANN BRADLEY RN         Placenta: Delivered:       appearance  Pending Diagnostic Studies:       Procedure Component Value Units Date/Time    Urinalysis, Reflex to Urine Culture Urine, Clean Catch [[id number]] Collected: 24    Order Status: Sent Lab Status: In process Updated: 24    Specimen: Urine             Discharged Condition: good    Disposition: Home or Self Care    Follow Up:   Follow-up Information       Itzel Marcelo CNM. Schedule an appointment as soon as possible for a visit in 1 week(s).    Specialty: Obstetrics and Gynecology  Why: Schedule 1 week and 6 week pp follow up.  Contact information:  [address]" [facility]  [facility] [address]  [phone]                           Patient Instructions:   No discharge procedures on file.  Medications:  Current Discharge Medication List        START taking these medications    Details   benzocaine-lanolin (DERMOPLAST) 20-0.5 % Aero Apply topically continuous prn (perineal discomfort).      !! ibuprofen (ADVIL,MOTRIN) 600 MG tablet Take 1.5 tablets (900 mg total) by mouth every 8 (eight) hours as needed (cramping).  Qty: 30 tablet, Refills: 0      !! ibuprofen (ADVIL,MOTRIN) 600 MG tablet Take 1 tablet (600 mg total) by mouth every 6 (six) hours as needed (cramping).  Qty: 30 tablet, Refills: 1      lanolin Crea cream Apply topically as needed for Dry Skin (to nipples).      PNV,calcium 72/iron/folic acid (PRENATAL VITAMIN) Tab Take 1 tablet by mouth once daily.  Qty: 30 tablet, Refills: 11       !! - Potential duplicate medications found. Please discuss with provider.        CONTINUE these medications which have CHANGED    Details   docusate sodium (COLACE) 100 MG capsule Take 2 capsules (200 mg total) by mouth 2 (two) times daily as needed for Constipation.             Itzel Marcelo CNM  Obstetrics  [facility]

## 2024-08-10 NOTE — ASSESSMENT & PLAN NOTE
Baby Boy - Corey  9lb 10oz, 22 inches long  At bedside. Breastfeeding well.   No circ  Care by neonatology   Primary Peds - Dr. YRAA Kahn at [facility]

## 2024-08-10 NOTE — SUBJECTIVE & OBJECTIVE
Interval History: PPD 2    She is doing well this morning. She is tolerating a regular diet without nausea or vomiting. She is voiding spontaneously. She is ambulating. She has passed flatus, and has not a BM. Vaginal bleeding is mild. She denies fever or chills. Abdominal pain is mild and controlled with oral medications. She Is breastfeeding. She desires circumcision for her male baby: no.    Objective:     Vital Signs (Most Recent):  Temp: 97.7 °F (36.5 °C) (08/10/24 0731)  Pulse: 70 (08/10/24 0731)  Resp: 18 (08/10/24 0731)  BP: 122/82 (08/10/24 0731)  SpO2: 97 % (08/10/24 0731) Vital Signs (24h Range):  Temp:  [97.7 °F (36.5 °C)-98.4 °F (36.9 °C)] 97.7 °F (36.5 °C)  Pulse:  [62-83] 70  Resp:  [15-18] 18  SpO2:  [95 %-98 %] 97 %  BP: (112-137)/(70-87) 122/82     Weight: 108.8 kg (239 lb 13.8 oz)  Body mass index is 37.57 kg/m².    No intake or output data in the 24 hours ending 08/10/24 0945      Significant Labs:  Lab Results   Component Value Date    GROUPTRH A NEG 08/07/2024    HEPBSAG Negative 01/17/2024    STREPBCULT positive 07/03/2024     Recent Labs   Lab 08/09/24  0617   HGB 10.5*   HCT 31.2*       I have personallly reviewed all pertinent lab results from the last 24 hours.  Recent Lab Results       None            Physical Exam:   Constitutional: She is oriented to person, place, and time. She appears well-developed and well-nourished. No distress.    HENT:   Head: Normocephalic and atraumatic.    Eyes: Pupils are equal, round, and reactive to light. EOM are normal.     Cardiovascular:  Normal rate, regular rhythm, normal heart sounds and intact distal pulses.           Pulse Score: 1+   Pulmonary/Chest: Effort normal and breath sounds normal.        Abdominal: Soft. Bowel sounds are normal.     Genitourinary:    Vagina and uterus normal.   Vaginal discharge: Normal lochia.           Musculoskeletal: Normal range of motion and moves all extremeties. Edema (+2 BLE) present.       Neurological: She is  alert and oriented to person, place, and time. She has normal reflexes. She displays normal reflexes.    Skin: Skin is warm and dry.    Psychiatric: She has a normal mood and affect. Her behavior is normal. Judgment and thought content normal.       Review of Systems

## 2024-08-10 NOTE — PROGRESS NOTES
[facility]  Obstetrics  Postpartum Progress Note    Patient Name: Adelaida Agarwal  MRN: [id number]  Admission Date: 2024  Hospital Length of Stay: 3 days  Attending Physician: Loyda Cunningham MD  Primary Care Provider: Aron Barrera Jr., MD    Subjective:     Principal Problem: (spontaneous vaginal delivery)    Hospital Course:  SVE 6.5/70/-2 on arrival, intact. Desires NCB.    24 - Recognized fatigue and request discussion regarding epidural. Questions answered. Anesthesia notified per RN of patient request.    24 PPD1 - Doing well. No complaints. Baby being monitored by NICU for tachypnea.    8/10/24 PPD2 - Doing well. No complaints. Baby at bedside. Ready for discharge.     Interval History: PPD 2    She is doing well this morning. She is tolerating a regular diet without nausea or vomiting. She is voiding spontaneously. She is ambulating. She has passed flatus, and has not a BM. Vaginal bleeding is mild. She denies fever or chills. Abdominal pain is mild and controlled with oral medications. She Is breastfeeding. She desires circumcision for her male baby: no.    Objective:     Vital Signs (Most Recent):  Temp: 97.7 °F (36.5 °C) (08/10/24 0731)  Pulse: 70 (08/10/24 0731)  Resp: 18 (08/10/24 0731)  BP: 122/82 (08/10/24 0731)  SpO2: 97 % (08/10/24 0731) Vital Signs (24h Range):  Temp:  [97.7 °F (36.5 °C)-98.4 °F (36.9 °C)] 97.7 °F (36.5 °C)  Pulse:  [62-83] 70  Resp:  [15-18] 18  SpO2:  [95 %-98 %] 97 %  BP: (112-137)/(70-87) 122/82     Weight: 108.8 kg (239 lb 13.8 oz)  Body mass index is 37.57 kg/m².    No intake or output data in the 24 hours ending 08/10/24 0945      Significant Labs:  Lab Results   Component Value Date    GROUPTRH A NEG 2024    HEPBSAG Negative 2024    STREPBCULT positive 2024     Recent Labs   Lab 24  0617   HGB 10.5*   HCT 31.2*       I have personallly reviewed all pertinent lab results from the last 24 hours.  Recent Lab  Results       None            Physical Exam:   Constitutional: She is oriented to person, place, and time. She appears well-developed and well-nourished. No distress.    HENT:   Head: Normocephalic and atraumatic.    Eyes: Pupils are equal, round, and reactive to light. EOM are normal.     Cardiovascular:  Normal rate, regular rhythm, normal heart sounds and intact distal pulses.           Pulse Score: 1+   Pulmonary/Chest: Effort normal and breath sounds normal.        Abdominal: Soft. Bowel sounds are normal.     Genitourinary:    Vagina and uterus normal.   Vaginal discharge: Normal lochia.           Musculoskeletal: Normal range of motion and moves all extremeties. Edema (+2 BLE) present.       Neurological: She is alert and oriented to person, place, and time. She has normal reflexes. She displays normal reflexes.    Skin: Skin is warm and dry.    Psychiatric: She has a normal mood and affect. Her behavior is normal. Judgment and thought content normal.       Review of Systems  Assessment/Plan:     39 y.o. female  for:    *  (spontaneous vaginal delivery)  Routine PP care  Plan to discharge today    Second degree perineal laceration during delivery  Routine michelle care    Liveborn infant by vaginal delivery  Baby Yonatan - Corey  9lb 10oz, 22 inches long  At bedside. Breastfeeding well.   No circ  Care by neonatology   Primary Peds - Dr. YARA Kahn at [facility]      Obesity  Ambulating frequently    Rubella non-immune status, antepartum  Discussed MMR   Declines MMR Post partum    Breast feeding status of mother  [place] at bedside  Breastfeeding well  Lactation consult    GBS carrier  Received partial dose of Vanc just prior to delivery        Rh negative, delivered, current hospitalization  Risk vs benefits of RhoGam discussed at length.  Declines RhoGam  Baby O Neg - No RhoGam needed        Disposition: As patient meets milestones, will plan to discharge today when baby discharged.    Itzel DENTON  MARY Marcelo  Obstetrics  [facility]

## 2024-08-10 NOTE — LACTATION NOTE
This note was copied from a baby's chart.     08/10/24 1215   Maternal Assessment   Breast Size Issue none   Breast Shape round   Breast Density soft   Areola elastic   Nipples everted   Maternal Infant Feeding   Maternal Emotional State independent;relaxed   Infant Positioning cross-cradle   Signs of Milk Transfer audible swallow   Pain with Feeding no   Latch Assistance no     Mom is breastfeeding now. Good nutritive sucking & swallowing noted. Instructed on the signs of an effective feeding.  Discussed positioning, comfortable latch, rhythmic, nutritive sucking, audible swallows, appropriate length of feeding, comfort of latch and evaluating for fullness cues.  Also discussed appropriate output for age. Breastfeeding discharge instructions reviewed. Assistance offered prn. Mom verbalized understanding

## 2024-08-12 ENCOUNTER — PATIENT OUTREACH (OUTPATIENT)
Dept: ADMINISTRATIVE | Facility: CLINIC | Age: 39
End: 2024-08-12

## 2025-06-13 ENCOUNTER — OFFICE VISIT (OUTPATIENT)
Dept: URGENT CARE | Facility: CLINIC | Age: 40
End: 2025-06-13

## 2025-06-13 VITALS
DIASTOLIC BLOOD PRESSURE: 78 MMHG | HEART RATE: 90 BPM | HEIGHT: 67 IN | RESPIRATION RATE: 18 BRPM | WEIGHT: 239 LBS | BODY MASS INDEX: 37.51 KG/M2 | TEMPERATURE: 98 F | SYSTOLIC BLOOD PRESSURE: 116 MMHG | OXYGEN SATURATION: 97 %

## 2025-06-13 DIAGNOSIS — L01.00 IMPETIGO: Primary | ICD-10-CM

## 2025-06-13 RX ORDER — CLINDAMYCIN HYDROCHLORIDE 300 MG/1
300 CAPSULE ORAL EVERY 8 HOURS
Qty: 21 CAPSULE | Refills: 0 | Status: SHIPPED | OUTPATIENT
Start: 2025-06-13 | End: 2025-06-20

## 2025-06-13 RX ORDER — MUPIROCIN 20 MG/G
OINTMENT TOPICAL 3 TIMES DAILY
Qty: 1 EACH | Refills: 0 | Status: SHIPPED | OUTPATIENT
Start: 2025-06-13 | End: 2025-06-18

## 2025-06-14 NOTE — PROGRESS NOTES
"Subjective:      Patient ID: dAelaida Agarwal is a 39 y.o. female.    Vitals:  height is 5' 7" (1.702 m) and weight is 108.4 kg (239 lb). Her oral temperature is 98.2 °F (36.8 °C). Her blood pressure is 116/78 and her pulse is 90. Her respiration is 18 and oxygen saturation is 97%.     Chief Complaint: Rash    Patient c/o rash onset last Saturday.     Provider note begins below:   Patient reports that her son currently has impetigo.  She reports that the sores to her chin started last Saturday.  She has been applying mupirocin 2-3 times daily without any improvement.    Rash  This is a new problem. The current episode started in the past 7 days. The problem is unchanged. The affected locations include the face. The rash is characterized by blistering, redness, itchiness, peeling, scaling and draining. She was exposed to nothing. Pertinent negatives include no anorexia, congestion, cough, diarrhea, eye pain, facial edema, fatigue, fever, joint pain, nail changes, rhinorrhea, shortness of breath, sore throat or vomiting. Past treatments include antibiotic cream. The treatment provided no relief.     Constitution: Negative. Negative for chills, sweating, fatigue and fever.   HENT: Negative.  Negative for ear pain, facial swelling, congestion and sore throat.    Neck: Negative for painful lymph nodes.   Cardiovascular: Negative.  Negative for chest trauma, chest pain and sob on exertion.   Eyes: Negative.  Negative for eye itching and eye pain.   Respiratory: Negative.  Negative for chest tightness, cough, shortness of breath and asthma.    Gastrointestinal: Negative.  Negative for nausea, vomiting and diarrhea.   Endocrine: negative. cold intolerance and excessive thirst.   Genitourinary: Negative.  Negative for dysuria, frequency, urgency and hematuria.   Musculoskeletal:  Negative for pain, trauma and joint pain.   Skin:  Positive for rash. Negative for wound, erythema and hives.   Allergic/Immunologic: " Negative.  Negative for eczema, asthma, hives and itching.   Neurological: Negative.  Negative for disorientation and altered mental status.   Hematologic/Lymphatic: Negative.  Negative for swollen lymph nodes.   Psychiatric/Behavioral: Negative.  Negative for altered mental status, disorientation and confusion.       Objective:     Physical Exam   Constitutional: She is oriented to person, place, and time. She appears well-developed.  Non-toxic appearance. She does not appear ill. No distress.   HENT:   Head: Normocephalic and atraumatic. Head is without abrasion, without contusion and without laceration.   Ears:   Right Ear: External ear normal.   Left Ear: External ear normal.   Nose: Nose normal.   Mouth/Throat: Oropharynx is clear and moist and mucous membranes are normal.   Eyes: Conjunctivae, EOM and lids are normal. Pupils are equal, round, and reactive to light.   Neck: Trachea normal and phonation normal. Neck supple.   Cardiovascular: Normal rate, regular rhythm and normal heart sounds.   Pulmonary/Chest: Effort normal. No stridor. No respiratory distress.   Abdominal: Normal appearance.   Musculoskeletal: Normal range of motion.         General: Normal range of motion.   Neurological: no focal deficit. She is alert, oriented to person, place, and time and at baseline.   Skin: Skin is warm, dry, intact, not diaphoretic and no rash. Capillary refill takes less than 2 seconds. No abrasion, No burn, No bruising, No erythema and No ecchymosis         Comments: Multiple lesions/sores (approximately 12) to chin area with surrounding erythema and yellow crusting.   Psychiatric: Her speech is normal and behavior is normal. Mood, judgment and thought content normal.   Nursing note and vitals reviewed.      Assessment:     1. Impetigo        Plan:   Clindamycin prescribed for impetigo multiple lesions.  Continue mupirocin.  Patient is allergic to penicillin.  Patient is currently breastfeeding.  Instructed mother  to take probiotic while on antibiotic.  PCP follow-up recommended.  Patient verbalized understanding of all discussed and agreed to plan.    FOLLOWUP  Follow up if symptoms worsen or fail to improve, for PLEASE CONTACT PCP OR CONTACT THE EMERGENCY ROOM..     PATIENT INSTRUCTIONS  Patient Instructions   INSTRUCTIONS:  - Rest.  - Drink plenty of fluids.  - Take Tylenol and/or Ibuprofen as directed as needed for fever/pain.  Do not take more than the recommended dose.  - follow up with your PCP within the next 1-2 weeks as needed.  - You must understand that you have received an Urgent Care treatment only and that you may be released before all of your medical problems are known or treated.   - You, the patient, will arrange for follow up care as instructed.   - If your condition worsens or fails to improve we recommend that you receive another evaluation at the ER immediately or contact your PCP to discuss your concerns.   - You can call [phone] or [phone] to help schedule an appointment with the appropriate provider.     -If you smoke cigarettes, it would be beneficial for you to stop.         Thank you for allowing me to participate in your health care,     FNP-C     Impetigo  -     clindamycin (CLEOCIN) 300 MG capsule; Take 1 capsule (300 mg total) by mouth every 8 (eight) hours. for 7 days  Dispense: 21 capsule; Refill: 0  -     mupirocin (BACTROBAN) 2 % ointment; Apply topically 3 (three) times daily. for 5 days  Dispense: 1 each; Refill: 0

## 2025-06-14 NOTE — PATIENT INSTRUCTIONS
